# Patient Record
Sex: MALE | Race: OTHER | HISPANIC OR LATINO | Employment: UNEMPLOYED | URBAN - METROPOLITAN AREA
[De-identification: names, ages, dates, MRNs, and addresses within clinical notes are randomized per-mention and may not be internally consistent; named-entity substitution may affect disease eponyms.]

---

## 2022-12-16 ENCOUNTER — HOSPITAL ENCOUNTER (EMERGENCY)
Facility: HOSPITAL | Age: 42
Discharge: HOME | End: 2022-12-17
Attending: EMERGENCY MEDICINE

## 2022-12-16 ENCOUNTER — APPOINTMENT (EMERGENCY)
Dept: RADIOLOGY | Facility: HOSPITAL | Age: 42
End: 2022-12-16

## 2022-12-16 DIAGNOSIS — R55 SYNCOPE: ICD-10-CM

## 2022-12-16 DIAGNOSIS — R07.9 CHEST PAIN IN ADULT: ICD-10-CM

## 2022-12-16 DIAGNOSIS — U07.1 COVID-19 VIRUS INFECTION: Primary | ICD-10-CM

## 2022-12-16 LAB
ALBUMIN SERPL BCP-MCNC: 4.2 G/DL (ref 3.5–5)
ALP SERPL-CCNC: 49 U/L (ref 34–104)
ALT SERPL W P-5'-P-CCNC: 22 U/L (ref 7–52)
ANION GAP SERPL CALCULATED.3IONS-SCNC: 9 MMOL/L (ref 4–13)
AST SERPL W P-5'-P-CCNC: 15 U/L (ref 13–39)
BASOPHILS # BLD AUTO: 0.03 THOUSANDS/ÂΜL (ref 0–0.1)
BASOPHILS NFR BLD AUTO: 0 % (ref 0–1)
BILIRUB SERPL-MCNC: 0.36 MG/DL (ref 0.2–1)
BNP SERPL-MCNC: 1 PG/ML (ref 0–100)
BUN SERPL-MCNC: 25 MG/DL (ref 5–25)
CALCIUM SERPL-MCNC: 8.9 MG/DL (ref 8.4–10.2)
CARDIAC TROPONIN I PNL SERPL HS: 4 NG/L
CHLORIDE SERPL-SCNC: 104 MMOL/L (ref 96–108)
CO2 SERPL-SCNC: 25 MMOL/L (ref 21–32)
CREAT SERPL-MCNC: 1.32 MG/DL (ref 0.6–1.3)
D DIMER PPP FEU-MCNC: <0.27 UG/ML FEU
EOSINOPHIL # BLD AUTO: 0.18 THOUSAND/ÂΜL (ref 0–0.61)
EOSINOPHIL NFR BLD AUTO: 3 % (ref 0–6)
ERYTHROCYTE [DISTWIDTH] IN BLOOD BY AUTOMATED COUNT: 13 % (ref 11.6–15.1)
FLUAV RNA RESP QL NAA+PROBE: NEGATIVE
FLUBV RNA RESP QL NAA+PROBE: NEGATIVE
GFR SERPL CREATININE-BSD FRML MDRD: 67 ML/MIN/1.73SQ M
GLUCOSE SERPL-MCNC: 138 MG/DL (ref 65–140)
HCT VFR BLD AUTO: 42.4 % (ref 36.5–49.3)
HGB BLD-MCNC: 14.4 G/DL (ref 12–17)
IMM GRANULOCYTES # BLD AUTO: 0.01 THOUSAND/UL (ref 0–0.2)
IMM GRANULOCYTES NFR BLD AUTO: 0 % (ref 0–2)
LYMPHOCYTES # BLD AUTO: 2.75 THOUSANDS/ÂΜL (ref 0.6–4.47)
LYMPHOCYTES NFR BLD AUTO: 39 % (ref 14–44)
MAGNESIUM SERPL-MCNC: 2 MG/DL (ref 1.9–2.7)
MCH RBC QN AUTO: 29.2 PG (ref 26.8–34.3)
MCHC RBC AUTO-ENTMCNC: 34 G/DL (ref 31.4–37.4)
MCV RBC AUTO: 86 FL (ref 82–98)
MONOCYTES # BLD AUTO: 0.4 THOUSAND/ÂΜL (ref 0.17–1.22)
MONOCYTES NFR BLD AUTO: 6 % (ref 4–12)
NEUTROPHILS # BLD AUTO: 3.76 THOUSANDS/ÂΜL (ref 1.85–7.62)
NEUTS SEG NFR BLD AUTO: 52 % (ref 43–75)
NRBC BLD AUTO-RTO: 0 /100 WBCS
PLATELET # BLD AUTO: 221 THOUSANDS/UL (ref 149–390)
PMV BLD AUTO: 11.4 FL (ref 8.9–12.7)
POTASSIUM SERPL-SCNC: 4 MMOL/L (ref 3.5–5.3)
PROT SERPL-MCNC: 7.1 G/DL (ref 6.4–8.4)
RBC # BLD AUTO: 4.93 MILLION/UL (ref 3.88–5.62)
RSV RNA RESP QL NAA+PROBE: NEGATIVE
SARS-COV-2 RNA RESP QL NAA+PROBE: POSITIVE
SODIUM SERPL-SCNC: 138 MMOL/L (ref 135–147)
WBC # BLD AUTO: 7.13 THOUSAND/UL (ref 4.31–10.16)

## 2022-12-16 RX ORDER — ACETAMINOPHEN 325 MG/1
650 TABLET ORAL ONCE
Status: COMPLETED | OUTPATIENT
Start: 2022-12-16 | End: 2022-12-16

## 2022-12-16 RX ORDER — KETOROLAC TROMETHAMINE 30 MG/ML
15 INJECTION, SOLUTION INTRAMUSCULAR; INTRAVENOUS ONCE
Status: COMPLETED | OUTPATIENT
Start: 2022-12-16 | End: 2022-12-16

## 2022-12-16 RX ADMIN — ACETAMINOPHEN 650 MG: 325 TABLET, FILM COATED ORAL at 22:41

## 2022-12-16 RX ADMIN — SODIUM CHLORIDE 1000 ML: 0.9 INJECTION, SOLUTION INTRAVENOUS at 22:20

## 2022-12-16 RX ADMIN — KETOROLAC TROMETHAMINE 15 MG: 30 INJECTION, SOLUTION INTRAMUSCULAR; INTRAVENOUS at 23:00

## 2022-12-16 NOTE — Clinical Note
Sophia Araceli was seen and treated in our emergency department on 12/16/2022  Diagnosis:     Maritza Bailey  may return to work on return date  He may return on this date: 12/23/2022         If you have any questions or concerns, please don't hesitate to call        Mayela Hammond MD    ______________________________           _______________          _______________  Hospital Representative                              Date                                Time

## 2022-12-16 NOTE — Clinical Note
Racquel Rose was seen and treated in our emergency department on 12/16/2022  Diagnosis:     China Meade  may return to work on return date  He may return on this date: 12/23/2022         If you have any questions or concerns, please don't hesitate to call        Natalie Ernandez MD    ______________________________           _______________          _______________  Hospital Representative                              Date                                Time

## 2022-12-17 VITALS
SYSTOLIC BLOOD PRESSURE: 98 MMHG | BODY MASS INDEX: 27.28 KG/M2 | DIASTOLIC BLOOD PRESSURE: 62 MMHG | HEIGHT: 68 IN | RESPIRATION RATE: 16 BRPM | OXYGEN SATURATION: 97 % | WEIGHT: 180 LBS | TEMPERATURE: 98.7 F | HEART RATE: 66 BPM

## 2022-12-17 LAB
2HR DELTA HS TROPONIN: 7 NG/L
4HR DELTA HS TROPONIN: 9 NG/L
ATRIAL RATE: 73 BPM
ATRIAL RATE: 78 BPM
CARDIAC TROPONIN I PNL SERPL HS: 11 NG/L
CARDIAC TROPONIN I PNL SERPL HS: 13 NG/L
P AXIS: 41 DEGREES
P AXIS: 43 DEGREES
PR INTERVAL: 124 MS
PR INTERVAL: 138 MS
QRS AXIS: 41 DEGREES
QRS AXIS: 65 DEGREES
QRSD INTERVAL: 100 MS
QRSD INTERVAL: 102 MS
QT INTERVAL: 362 MS
QT INTERVAL: 370 MS
QTC INTERVAL: 407 MS
QTC INTERVAL: 412 MS
T WAVE AXIS: 62 DEGREES
T WAVE AXIS: 64 DEGREES
VENTRICULAR RATE: 73 BPM
VENTRICULAR RATE: 78 BPM

## 2022-12-17 RX ORDER — LORAZEPAM 2 MG/ML
0.5 INJECTION INTRAMUSCULAR ONCE
Status: COMPLETED | OUTPATIENT
Start: 2022-12-17 | End: 2022-12-17

## 2022-12-17 RX ADMIN — LORAZEPAM 0.5 MG: 2 INJECTION INTRAMUSCULAR; INTRAVENOUS at 00:39

## 2022-12-17 NOTE — ED PROVIDER NOTES
History  Chief Complaint   Patient presents with   • Syncope     Reports syncopal episode while showering, reports chest pressure     Patient is a 42-year-old male seen in the emergency department with concern for episode of syncope while showering this evening  Patient states that he was feeling lightheaded/dizzy in addition to some chest pressure while in the shower at home prior to evaluation  Patient states that he had a loss of consciousness  Patient denies having similar symptoms in the past   Patient notes no headache, shortness of breath, cough, hemoptysis, fever, nausea, vomiting, diarrhea, weakness  Patient states that he has been feeling somewhat tired over approximately the past 2-3 days  Patient states that he has been eating and drinking normally  Patient denies any similar episodes in the past   Patient denies any known history of cardiac problems in his family  Patient states that he takes supplements at home, including [place] and Test X180 [place]  None       History reviewed  No pertinent past medical history  History reviewed  No pertinent surgical history  History reviewed  No pertinent family history  I have reviewed and agree with the history as documented  E-Cigarette/Vaping   • E-Cigarette Use Never User      E-Cigarette/Vaping Substances     Social History     Tobacco Use   • Smoking status: Former     Types: Cigarettes   • Smokeless tobacco: Never   Vaping Use   • Vaping Use: Never used   Substance Use Topics   • Alcohol use: Yes   • Drug use: Not Currently       Review of Systems   Constitutional: Positive for fatigue  Negative for chills and fever  HENT: Negative for drooling and trouble swallowing  Eyes: Negative for pain and visual disturbance  Respiratory: Negative for cough and shortness of breath  Cardiovascular: Positive for chest pain  Negative for palpitations  Gastrointestinal: Negative for abdominal pain and vomiting     Genitourinary: Negative for decreased urine volume and difficulty urinating  Musculoskeletal: Negative for gait problem and neck stiffness  Skin: Negative for color change and rash  Neurological: Positive for dizziness and syncope  Psychiatric/Behavioral: Negative for agitation and confusion  All other systems reviewed and are negative  Physical Exam  Physical Exam  Vitals and nursing note reviewed  Constitutional:       General: He is not in acute distress  Appearance: He is well-developed  HENT:      Head: Normocephalic and atraumatic  Right Ear: External ear normal       Left Ear: External ear normal       Nose: Nose normal       Mouth/Throat:      Pharynx: Oropharynx is clear  Eyes:      General: No scleral icterus  Conjunctiva/sclera: Conjunctivae normal    Cardiovascular:      Rate and Rhythm: Normal rate and regular rhythm  Heart sounds: No murmur heard  Pulmonary:      Effort: Pulmonary effort is normal  No respiratory distress  Breath sounds: Normal breath sounds  Abdominal:      General: There is no distension  Palpations: Abdomen is soft  Tenderness: There is no abdominal tenderness  Musculoskeletal:         General: No swelling, deformity or signs of injury  Cervical back: Normal range of motion and neck supple  Skin:     General: Skin is warm and dry  Neurological:      General: No focal deficit present  Mental Status: He is alert  Cranial Nerves: No cranial nerve deficit  Sensory: No sensory deficit  Psychiatric:         Mood and Affect: Mood normal          Thought Content:  Thought content normal          Vital Signs  ED Triage Vitals   Temperature Pulse Respirations Blood Pressure SpO2   12/16/22 2212 12/16/22 2212 12/16/22 2212 12/16/22 2212 12/16/22 2212   98 7 °F (37 1 °C) 80 18 120/74 99 %      Temp Source Heart Rate Source Patient Position - Orthostatic VS BP Location FiO2 (%)   12/16/22 2212 12/16/22 2212 12/16/22 2212 12/16/22 2212 --   Oral Monitor Lying Left arm       Pain Score       12/16/22 2241       8           Vitals:    12/16/22 2212 12/16/22 2357 12/17/22 0038   BP: 120/74 116/71 106/73   Pulse: 80 76 70   Patient Position - Orthostatic VS: Lying Lying Sitting         Visual Acuity      ED Medications  Medications   sodium chloride 0 9 % bolus 1,000 mL (0 mL Intravenous Stopped 12/17/22 0038)   acetaminophen (TYLENOL) tablet 650 mg (650 mg Oral Given 12/16/22 2241)   ketorolac (TORADOL) injection 15 mg (15 mg Intravenous Given 12/16/22 2300)   LORazepam (ATIVAN) injection 0 5 mg (0 5 mg Intravenous Given 12/17/22 0039)       Diagnostic Studies  Results Reviewed     Procedure Component Value Units Date/Time    HS Troponin I 2hr [[id number]]  (Normal) Collected: 12/16/22 2358    Lab Status: Final result Specimen: Blood from Arm, Right Updated: 12/17/22 0028     hs TnI 2hr 11 ng/L      Delta 2hr hsTnI 7 ng/L     HS Troponin I 4hr [[id number]]     Lab Status: No result Specimen: Blood     COVID[facility], Influ[facility] A/B, [facility] [[id number]]  (Abnormal) Collected: 12/16/22 2220    Lab Status: Final result Specimen: Nares from Nose Updated: 12/16/22 2306     SARS-CoV-2 Positive     INFLUENZA A PCR Negative     INFLUENZA B PCR Negative     RSV PCR Negative    Narrative:      FOR PEDIATRIC PATIENTS - copy/paste COVID Guidelines URL to browser: [email or web address]  [email or web address]    SARS-CoV-2 assay is a Nucleic Acid Amplification assay intended for the  qualitative detection of nucleic acid from SARS-CoV-2 in nasopharyngeal  swabs  Results are for the presumptive identification of SARS-CoV-2 RNA  Positive results are indicative of infection with SARS-CoV-2, the virus  causing COVID-19, but do not rule out bacterial infection or co-infection  with other viruses   Laboratories within the [place] and its  territories are required to report all positive results to the appropriate  public health authorities  Negative results do not preclude SARS-CoV-2  infection and should not be used as the sole basis for treatment or other  patient management decisions  Negative results must be combined with  clinical observations, patient history, and epidemiological information  This test has not been FDA cleared or approved  This test has been authorized by FDA under an Emergency Use Authorization  (EUA)  This test is only authorized for the duration of time the  declaration that circumstances exist justifying the authorization of the  emergency use of an in vitro diagnostic tests for detection of SARS-CoV-2  virus and/or diagnosis of COVID-19 infection under section 564(b)(1) of  the Act, 21 U  S C  185LJB-7(U)(6), unless the authorization is terminated  or revoked sooner  The test has been validated but independent review by FDA  and CLIA is pending  Test performed using [employer] GeneXpert: This RT-PCR assay targets N2,  a region unique to SARS-CoV-2  A conserved region in the E-gene was chosen  for pan-Sarbecovirus detection which includes SARS-CoV-2  According to CMS-2020-01-R, this platform meets the definition of high-throughput technology      B-Type Natriuretic Peptide(BNP), AN, CA, [place] Camp[facility] Only [[id number]]  (Normal) Collected: 12/16/22 2220    Lab Status: Final result Specimen: Blood from Arm, Right Updated: 12/16/22 2256     BNP 1 pg/mL     HS Troponin 0hr (reflex protocol) [[id number]]  (Normal) Collected: 12/16/22 2220    Lab Status: Final result Specimen: Blood from Arm, Right Updated: 12/16/22 2253     hs TnI 0hr 4 ng/L     D-dimer, quantitative [[id number]]  (Normal) Collected: 12/16/22 2220    Lab Status: Final result Specimen: Blood from Arm, Right Updated: 12/16/22 2249     D-Dimer, Quant <0 27 ug/ml FEU     Comprehensive metabolic panel [[id number]]  (Abnormal) Collected: 12/16/22 2220    Lab Status: Final result Specimen: Blood from Arm, Right Updated: 12/16/22 2249     Sodium 138 mmol/L Potassium 4 0 mmol/L      Chloride 104 mmol/L      CO2 25 mmol/L      ANION GAP 9 mmol/L      BUN 25 mg/dL      Creatinine 1 32 mg/dL      Glucose 138 mg/dL      Calcium 8 9 mg/dL      AST 15 U/L      ALT 22 U/L      Alkaline Phosphatase 49 U/L      Total Protein 7 1 g/dL      Albumin 4 2 g/dL      Total Bilirubin 0 36 mg/dL      eGFR 67 ml/min/1 73sq m     Narrative:      [place] guidelines for Chronic Kidney Disease (CKD):   •  Stage 1 with normal or high GFR (GFR > 90 mL/min/1 73 square meters)  •  Stage 2 Mild CKD (GFR = 60-89 mL/min/1 73 square meters)  •  Stage 3A Moderate CKD (GFR = 45-59 mL/min/1 73 square meters)  •  Stage 3B Moderate CKD (GFR = 30-44 mL/min/1 73 square meters)  •  Stage 4 Severe CKD (GFR = 15-29 mL/min/1 73 square meters)  •  Stage 5 End Stage CKD (GFR <15 mL/min/1 73 square meters)  Note: GFR calculation is accurate only with a steady state creatinine    Magnesium [[id number]]  (Normal) Collected: 12/16/22 2220    Lab Status: Final result Specimen: Blood from Arm, Right Updated: 12/16/22 2249     Magnesium 2 0 mg/dL     CBC and differential [[id number]] Collected: 12/16/22 2220    Lab Status: Final result Specimen: Blood from Arm, Right Updated: 12/16/22 2228     WBC 7 13 Thousand/uL      RBC 4 93 Million/uL      Hemoglobin 14 4 g/dL      Hematocrit 42 4 %      MCV 86 fL      MCH 29 2 pg      MCHC 34 0 g/dL      RDW 13 0 %      MPV 11 4 fL      Platelets 455 Thousands/uL      nRBC 0 /100 WBCs      Neutrophils Relative 52 %      Immat GRANS % 0 %      Lymphocytes Relative 39 %      Monocytes Relative 6 %      Eosinophils Relative 3 %      Basophils Relative 0 %      Neutrophils Absolute 3 76 Thousands/µL      Immature Grans Absolute 0 01 Thousand/uL      Lymphocytes Absolute 2 75 Thousands/µL      Monocytes Absolute 0 40 Thousand/µL      Eosinophils Absolute 0 18 Thousand/µL      Basophils Absolute 0 03 Thousands/µL                  XR chest 1 view portable   ED Interpretation by José Gifford MD (12/16 2241)   No infiltrate or pneumothorax                 Procedures  ECG 12 Lead Documentation Only    Date/Time: 12/16/2022 10:20 PM  Performed by: José Gifford MD  Authorized by: José Gifford MD     Indications / Diagnosis:  Syncope  Patient location:  ED  Rate:     ECG rate:  78    ECG rate assessment: normal    Rhythm:     Rhythm: sinus rhythm    QRS:     QRS axis:  Normal  ST segments:     ST segments:  Non-specific  T waves:     T waves: non-specific    Comments:      Normal sinus rhythm at 78, normal axis, , , QTc 412, nonspecific ST-T wave abnormality, no definite evidence of acute ischemia    ECG 12 Lead Documentation Only    Date/Time: 12/17/2022 12:56 AM  Performed by: José Gifford MD  Authorized by: José Gifford MD     Indications / Diagnosis:  Chest pain  ECG reviewed by me, the ED Provider: yes    Patient location:  ED  Rate:     ECG rate:  74    ECG rate assessment: normal    Rhythm:     Rhythm: sinus rhythm    QRS:     QRS axis:  Normal  ST segments:     ST segments:  Non-specific  T waves:     T waves: non-specific    Comments:      Normal sinus rhythm at 73, normal axis, , , QTc 407, nonspecific ST-T wave abnormality, no definite evidence of acute ischemia             ED Course             HEART Risk Score    Flowsheet Row Most Recent Value   Heart Score Risk Calculator    History 1 Filed at: 12/17/2022 0040   ECG 1 Filed at: 12/17/2022 0040   Age 0 Filed at: 12/17/2022 0040   Risk Factors 0 Filed at: 12/17/2022 0040   Troponin 0 Filed at: 12/17/2022 0040   HEART Score 2 Filed at: 12/17/2022 0040                                      MDM  Number of Diagnoses or Management Options  Chest pain in adult  COVID-19 virus infection  Syncope  Diagnosis management comments: Patient is a 82-year-old male seen in the emergency department with concern for episode of chest pain, syncope    Differential diagnosis includes ACS, PE, pneumothorax, pneumonia, viral illness, vasovagal episode  EKG was obtained and noted  Chest x-ray showed no infiltrate or pneumothorax  Patient was treated with medication for symptom control  COVID-19 test was ordered in the emergency department, and was positive  Laboratory evaluation remarkable for D-dimer within normal limits, elevated creatinine of 1 32, serial troponins of 4, 11  Patient signed out to my colleague at change of shift, with plan for repeat troponin  Amount and/or Complexity of Data Reviewed  Clinical lab tests: ordered and reviewed  Tests in the radiology section of CPT®: ordered and reviewed  Tests in the medicine section of CPT®: ordered and reviewed        Disposition  Final diagnoses:   Syncope   Chest pain in adult   COVID-19 virus infection     Time reflects when diagnosis was documented in both MDM as applicable and the Disposition within this note     Time User Action Codes Description Comment    12/16/2022 10:45 PM Yousif Hind Add [R55] Syncope     12/16/2022 10:45 PM Yousif Hind Add [R07 9] Chest pain in adult     12/16/2022 11:08 PM Yousif Hind Add [U07 1] COVID-19 virus infection     12/16/2022 11:08 PM Yousif Hind Modify [R55] Syncope     12/16/2022 11:08 PM Yousif Hind Modify [U07 1] COVID-19 virus infection       ED Disposition     None      Follow-up Information     Follow up With Specialties Details Why Contact Info Additional Information    Your primary doctor  Call in 1 day       [place] Call  As needed [address] [phone]  [address]  , [place], [place], [address]          Patient's Medications    No medications on file       No discharge procedures on file      PDMP Review     None          ED Provider  Electronically Signed by           Natalie Ernandez MD  12/1980

## 2022-12-17 NOTE — ED CARE HANDOFF
Emergency Department Sign Out Note        HEART Risk Score    Flowsheet Row Most Recent Value   Heart Score Risk Calculator    History 1 Filed at: 12/17/2022 0040   ECG 1 Filed at: 12/17/2022 0040   Age 0 Filed at: 12/17/2022 0040   Risk Factors 0 Filed at: 12/17/2022 0040   Troponin 0 Filed at: 12/17/2022 0040   HEART Score 2 Filed at: 12/17/2022 0040                ED Course as of 12/17/22 0336   Sat Dec 17, 2022   0114 Patient was signed out to me at change of shift  He is pending a repeat troponin  The patient is COVID positive and is at low risk based on Heart Score  2780 Patient had a third repeat troponin of 13  The delta is 9  The patient's has no current chest pain and has a heart score of 2  The EKG is nonischemic   the patient is at low risk for ACS event  the patient is positive for COVID and his symptoms are likely due to his infection  The patient did have a negative D-dimer  He will be discharged from follow-up to his primary care physician  Procedures  MDM        Disposition  Final diagnoses:   Syncope   Chest pain in adult   COVID-19 virus infection     Time reflects when diagnosis was documented in both MDM as applicable and the Disposition within this note     Time User Action Codes Description Comment    12/16/2022 10:45 PM Gwendolyn Precise Add [R55] Syncope     12/16/2022 10:45 PM Gwendolyn Precise Add [R07 9] Chest pain in adult     12/16/2022 11:08 PM Gwendolyn Precise Add [U07 1] COVID-19 virus infection     12/16/2022 11:08 PM Gwendolyn Precise Modify [R55] Syncope     12/16/2022 11:08 PM Gwendolyn Precise Modify [U07 1] COVID-19 virus infection       ED Disposition     ED Disposition   Discharge    Condition   Stable    Date/Time   Sat Dec 17, 2022  3:07 AM    Comment   [address] discharge to home/self care                 Follow-up Information     Follow up With Specialties Details Why Contact Info Additional Information    Your primary doctor  Call in 1 day       [facility] [address] Call  As needed [address] [phone]  [address] , [place], [place], [address]        There are no discharge medications for this patient  No discharge procedures on file         ED Provider  Electronically Signed by     Vesna Carpenter DO  12/17/22 5760

## 2022-12-30 ENCOUNTER — HOSPITAL ENCOUNTER (EMERGENCY)
Facility: HOSPITAL | Age: 42
Discharge: HOME | End: 2022-12-30
Attending: EMERGENCY MEDICINE

## 2022-12-30 ENCOUNTER — APPOINTMENT (EMERGENCY)
Dept: RADIOLOGY | Facility: HOSPITAL | Age: 42
End: 2022-12-30

## 2022-12-30 VITALS
SYSTOLIC BLOOD PRESSURE: 114 MMHG | HEART RATE: 86 BPM | DIASTOLIC BLOOD PRESSURE: 66 MMHG | RESPIRATION RATE: 18 BRPM | OXYGEN SATURATION: 99 % | TEMPERATURE: 98.6 F

## 2022-12-30 DIAGNOSIS — R07.9 CHEST PAIN: Primary | ICD-10-CM

## 2022-12-30 LAB
2HR DELTA HS TROPONIN: <0 NG/L
ANION GAP SERPL CALCULATED.3IONS-SCNC: 8 MMOL/L (ref 4–13)
ATRIAL RATE: 84 BPM
BASOPHILS # BLD AUTO: 0.04 THOUSANDS/ÂΜL (ref 0–0.1)
BASOPHILS NFR BLD AUTO: 1 % (ref 0–1)
BUN SERPL-MCNC: 18 MG/DL (ref 5–25)
CALCIUM SERPL-MCNC: 9.1 MG/DL (ref 8.4–10.2)
CARDIAC TROPONIN I PNL SERPL HS: 2 NG/L
CARDIAC TROPONIN I PNL SERPL HS: <2 NG/L
CHLORIDE SERPL-SCNC: 102 MMOL/L (ref 96–108)
CO2 SERPL-SCNC: 26 MMOL/L (ref 21–32)
CREAT SERPL-MCNC: 1.21 MG/DL (ref 0.6–1.3)
EOSINOPHIL # BLD AUTO: 0.27 THOUSAND/ÂΜL (ref 0–0.61)
EOSINOPHIL NFR BLD AUTO: 4 % (ref 0–6)
ERYTHROCYTE [DISTWIDTH] IN BLOOD BY AUTOMATED COUNT: 13 % (ref 11.6–15.1)
GFR SERPL CREATININE-BSD FRML MDRD: 74 ML/MIN/1.73SQ M
GLUCOSE SERPL-MCNC: 95 MG/DL (ref 65–140)
HCT VFR BLD AUTO: 39.5 % (ref 36.5–49.3)
HGB BLD-MCNC: 13.4 G/DL (ref 12–17)
IMM GRANULOCYTES # BLD AUTO: 0.01 THOUSAND/UL (ref 0–0.2)
IMM GRANULOCYTES NFR BLD AUTO: 0 % (ref 0–2)
LYMPHOCYTES # BLD AUTO: 2.08 THOUSANDS/ÂΜL (ref 0.6–4.47)
LYMPHOCYTES NFR BLD AUTO: 29 % (ref 14–44)
MCH RBC QN AUTO: 28.9 PG (ref 26.8–34.3)
MCHC RBC AUTO-ENTMCNC: 33.9 G/DL (ref 31.4–37.4)
MCV RBC AUTO: 85 FL (ref 82–98)
MONOCYTES # BLD AUTO: 0.45 THOUSAND/ÂΜL (ref 0.17–1.22)
MONOCYTES NFR BLD AUTO: 6 % (ref 4–12)
NEUTROPHILS # BLD AUTO: 4.26 THOUSANDS/ÂΜL (ref 1.85–7.62)
NEUTS SEG NFR BLD AUTO: 60 % (ref 43–75)
NRBC BLD AUTO-RTO: 0 /100 WBCS
P AXIS: 39 DEGREES
PLATELET # BLD AUTO: 207 THOUSANDS/UL (ref 149–390)
PMV BLD AUTO: 11.1 FL (ref 8.9–12.7)
POTASSIUM SERPL-SCNC: 3.9 MMOL/L (ref 3.5–5.3)
PR INTERVAL: 134 MS
QRS AXIS: 35 DEGREES
QRSD INTERVAL: 104 MS
QT INTERVAL: 364 MS
QTC INTERVAL: 430 MS
RBC # BLD AUTO: 4.63 MILLION/UL (ref 3.88–5.62)
SODIUM SERPL-SCNC: 136 MMOL/L (ref 135–147)
T WAVE AXIS: 56 DEGREES
VENTRICULAR RATE: 84 BPM
WBC # BLD AUTO: 7.11 THOUSAND/UL (ref 4.31–10.16)

## 2022-12-30 RX ORDER — ACETAMINOPHEN 500 MG
1000 TABLET ORAL EVERY 6 HOURS PRN
Qty: 40 TABLET | Refills: 0 | Status: SHIPPED | OUTPATIENT
Start: 2022-12-30

## 2022-12-30 RX ORDER — NAPROXEN 250 MG/1
250 TABLET ORAL 2 TIMES DAILY PRN
Qty: 30 TABLET | Refills: 0 | Status: SHIPPED | OUTPATIENT
Start: 2022-12-30 | End: 2023-01-14

## 2022-12-30 RX ORDER — SODIUM CHLORIDE 9 MG/ML
3 INJECTION INTRAVENOUS
Status: DISCONTINUED | OUTPATIENT
Start: 2022-12-30 | End: 2022-12-30 | Stop reason: HOSPADM

## 2022-12-30 RX ORDER — KETOROLAC TROMETHAMINE 30 MG/ML
15 INJECTION, SOLUTION INTRAMUSCULAR; INTRAVENOUS ONCE
Status: COMPLETED | OUTPATIENT
Start: 2022-12-30 | End: 2022-12-30

## 2022-12-30 RX ADMIN — KETOROLAC TROMETHAMINE 15 MG: 30 INJECTION, SOLUTION INTRAMUSCULAR at 17:56

## 2022-12-30 NOTE — ED PROVIDER NOTES
History  Chief Complaint   Patient presents with   • Chest Pain     PT presents to ED from home w/ CP  No cardiac hx      31-year-old male presents to the emergency department for paroxysmal episodes of chest pressure  Present for couple weeks  Associated with tingling in his fingers when they happen  Associated with lightheadedness  Has not passed out  No episodes of syncope  Nothing makes the pain better or worse  Goes away on its own  Lasts 30 minutes to a couple hours at a time  Is currently having 1 of these episodes  Was seen previously for chest pain  Diagnosed with COVID-19 on December 16  Denies any other symptoms  No hemoptysis, recent travel or hospitalization, shortness of breath, lower extremity edema, history of VTE  No ACS risk factors  Pain not ripping or tearing  Denies lower extremity edema  Chest Pain  Pain location:  L chest  Pain quality: pressure    Pain radiates to:  Does not radiate  Pain radiates to the back: no    Pain severity:  Moderate  Onset quality:  Gradual  Timing:  Intermittent  Progression:  Waxing and waning  Chronicity:  New  Relieved by:  Nothing  Worsened by:  Nothing tried      None       History reviewed  No pertinent past medical history  History reviewed  No pertinent surgical history  History reviewed  No pertinent family history  I have reviewed and agree with the history as documented  E-Cigarette/Vaping   • E-Cigarette Use Never User      E-Cigarette/Vaping Substances     Social History     Tobacco Use   • Smoking status: Former     Types: Cigarettes   • Smokeless tobacco: Never   Vaping Use   • Vaping Use: Never used   Substance Use Topics   • Alcohol use: Yes   • Drug use: Not Currently       Review of Systems   Cardiovascular: Positive for chest pain  Neurological: Positive for light-headedness  Hand paresthesias     All other systems reviewed and are negative        Physical Exam  Physical Exam  Vitals and nursing note reviewed  Constitutional:       General: He is not in acute distress  Appearance: He is well-developed  He is not diaphoretic  HENT:      Head: Normocephalic and atraumatic  Right Ear: External ear normal       Left Ear: External ear normal    Eyes:      Conjunctiva/sclera: Conjunctivae normal    Neck:      Trachea: No tracheal deviation  Cardiovascular:      Rate and Rhythm: Normal rate and regular rhythm  Pulses:           Radial pulses are 2+ on the right side and 2+ on the left side  Heart sounds: Normal heart sounds  No murmur heard  Pulmonary:      Effort: No respiratory distress  Breath sounds: Normal breath sounds  No stridor  No wheezing or rales  Abdominal:      General: Bowel sounds are normal  There is no distension  Palpations: Abdomen is soft  There is no mass  Tenderness: There is no abdominal tenderness  There is no guarding or rebound  Comments: No pulsatile abdominal mass  Musculoskeletal:         General: No tenderness or deformity  Skin:     General: Skin is dry  Findings: No rash  Neurological:      Motor: No abnormal muscle tone  Coordination: Coordination normal    Psychiatric:         Behavior: Behavior normal          Thought Content:  Thought content normal          Judgment: Judgment normal          Vital Signs  ED Triage Vitals   Temperature Pulse Respirations Blood Pressure SpO2   12/30/22 1658 12/30/22 1658 12/30/22 1658 12/30/22 1658 12/30/22 1658   98 6 °F (37 °C) 79 16 115/73 100 %      Temp Source Heart Rate Source Patient Position - Orthostatic VS BP Location FiO2 (%)   12/30/22 1658 12/30/22 1947 12/30/22 1947 12/30/22 1947 --   Oral Monitor Lying Right arm       Pain Score       12/30/22 1756       7           Vitals:    12/30/22 1658 12/30/22 1947 12/30/22 2038   BP: 115/73 113/71 114/66   Pulse: 79 74 86   Patient Position - Orthostatic VS:  Lying Sitting         Visual Acuity      ED Medications  Medications sodium chloride (PF) 0 9 % injection 3 mL (has no administration in time range)   ketorolac (TORADOL) injection 15 mg (15 mg Intravenous Given 12/30/22 1756)       Diagnostic Studies  Results Reviewed     Procedure Component Value Units Date/Time    HS Troponin I 2hr [[id number]]  (Normal) Collected: 12/30/22 1953    Lab Status: Final result Specimen: Blood from Arm, Left Updated: 12/30/22 2024     hs TnI 2hr <2 ng/L      Delta 2hr hsTnI <0 ng/L     HS Troponin I 4hr [[id number]]     Lab Status: No result Specimen: Blood     HS Troponin 0hr (reflex protocol) [[id number]]  (Normal) Collected: 12/30/22 1758    Lab Status: Final result Specimen: Blood from Arm, Left Updated: 12/30/22 1825     hs TnI 0hr 2 ng/L     Basic metabolic panel [[id number]] Collected: 12/30/22 1758    Lab Status: Final result Specimen: Blood from Arm, Left Updated: 12/30/22 1819     Sodium 136 mmol/L      Potassium 3 9 mmol/L      Chloride 102 mmol/L      CO2 26 mmol/L      ANION GAP 8 mmol/L      BUN 18 mg/dL      Creatinine 1 21 mg/dL      Glucose 95 mg/dL      Calcium 9 1 mg/dL      eGFR 74 ml/min/1 73sq m     Narrative:      [place] guidelines for Chronic Kidney Disease (CKD):   •  Stage 1 with normal or high GFR (GFR > 90 mL/min/1 73 square meters)  •  Stage 2 Mild CKD (GFR = 60-89 mL/min/1 73 square meters)  •  Stage 3A Moderate CKD (GFR = 45-59 mL/min/1 73 square meters)  •  Stage 3B Moderate CKD (GFR = 30-44 mL/min/1 73 square meters)  •  Stage 4 Severe CKD (GFR = 15-29 mL/min/1 73 square meters)  •  Stage 5 End Stage CKD (GFR <15 mL/min/1 73 square meters)  Note: GFR calculation is accurate only with a steady state creatinine    CBC and differential [[id number]] Collected: 12/30/22 1758    Lab Status: Final result Specimen: Blood from Arm, Left Updated: 12/30/22 1803     WBC 7 11 Thousand/uL      RBC 4 63 Million/uL      Hemoglobin 13 4 g/dL      Hematocrit 39 5 %      MCV 85 fL      MCH 28 9 pg      MCHC 33 9 g/dL      RDW 13 0 %      MPV 11 1 fL      Platelets 792 Thousands/uL      nRBC 0 /100 WBCs      Neutrophils Relative 60 %      Immat GRANS % 0 %      Lymphocytes Relative 29 %      Monocytes Relative 6 %      Eosinophils Relative 4 %      Basophils Relative 1 %      Neutrophils Absolute 4 26 Thousands/µL      Immature Grans Absolute 0 01 Thousand/uL      Lymphocytes Absolute 2 08 Thousands/µL      Monocytes Absolute 0 45 Thousand/µL      Eosinophils Absolute 0 27 Thousand/µL      Basophils Absolute 0 04 Thousands/µL                  X-ray chest 1 view portable   ED Interpretation by Nikki Bright DO (12/30 1803)   No acute cardiopulmonary findings  Procedures  Procedures         ED Course  ED Course as of 12/30/22 2041   Fri Dec 30, 2022   1722 Procedure Note: EKG  Date/Time: 12/30/22 5:22 PM   Interpreted by: Miriam Roque  Indications / Diagnosis: CP  ECG reviewed by me, the ED Provider: yes   The EKG demonstrates:  Rhythm: normal sinus  Intervals: normal intervals  Axis: normal axis  QRS/Blocks: normal QRS/ incomplete RBBB  ST Changes: No acute ST Changes, no STD/JUAREZ  1mm elevation in v2 from incomplete RBB/ BEAU  Consistent with previous ecg       2024 Procedure Note: EKG  Date/Time: 12/30/22 8:24 PM   Interpreted by: Miriam Roque  Indications / Diagnosis: CP  ECG reviewed by me, the ED Provider: yes   The EKG demonstrates:  Rhythm: normal sinus  Intervals: normal intervals  Axis: normal axis  QRS/Blocks: normal QRS  ST Changes: No acute ST Changes, no STD/JUAREZ                 HEART Risk Score    Flowsheet Row Most Recent Value   Heart Score Risk Calculator    History 0 Filed at: 12/30/2022 2025   ECG 0 Filed at: 12/30/2022 2025   Age 0 Filed at: 12/30/2022 2025   Risk Factors 0 Filed at: 12/30/2022 2025   Troponin 0 Filed at: 12/30/2022 2025   HEART Score 0 Filed at: 12/30/2022 2025                                      MDM  Number of Diagnoses or Management Options  Chest pain: new and requires workup  Diagnosis management comments: Patient with chest pressure, lightheadedness, paresthesias in the hands during these episodes of chest pressure/lightheadedness  Will evaluate for ACS, arrhythmia, electrolyte abnormalities, myocarditis, pneumonia, pneumothorax, acute intrathoracic abnormality  No signs of heart failure on examination  PERC negative  Wells score 0  Not ripping or tearing pain  Not typical for dissection  Heart score of 0  Will discharge home  Follow-up with PCP  Return precautions given  History and findings performed using iPad  service  Amount and/or Complexity of Data Reviewed  Clinical lab tests: ordered and reviewed  Tests in the radiology section of CPT®: ordered and reviewed  Review and summarize past medical records: yes  Independent visualization of images, tracings, or specimens: yes    Risk of Complications, Morbidity, and/or Mortality  Presenting problems: moderate  Diagnostic procedures: moderate  Management options: moderate        Disposition  Final diagnoses:   Chest pain     Time reflects when diagnosis was documented in both MDM as applicable and the Disposition within this note     Time User Action Codes Description Comment    12/30/2022  8:25 PM Nohemi Bonilla Add [R07 9] Chest pain       ED Disposition     ED Disposition   Discharge    Condition   Stable    Date/Time   Fri Dec 30, 2022  8:25 PM    [place] discharge to home/self care                 Follow-up Information     Follow up With Specialties Details Why Contact Info Additional [facility] Schedule an appointment as soon as possible for a visit  As needed [address] [facility] [address] Cardiology [address] [facility], [address] [place], [place], [address] [facility] 21    [address] Emergency Department Emergency Medicine  If symptoms worsen [address] [place] [address] Emergency Department, [address]    [place]  Call  for a new primary care provider [phone]             Patient's Medications   Discharge Prescriptions    ACETAMINOPHEN (TYLENOL) 500 MG TABLET    Take 2 tablets (1,000 mg total) by mouth every 6 (six) hours as needed for mild pain for up to 20 doses       Start Date: 12/30/2022End Date: --       Order Dose: 1,000 mg       Quantity: 40 tablet    Refills: 0    NAPROXEN (NAPROSYN) 250 MG TABLET    Take 1 tablet (250 mg total) by mouth 2 (two) times a day as needed for mild pain for up to 15 days       Start Date: 12/30/2022End Date: 1/14/2023       Order Dose: 250 mg       Quantity: 30 tablet    Refills: 0       No discharge procedures on file      PDMP Review     None          ED Provider  Electronically Signed by           Lukasz Rodriguez DO  12/30/22 2046

## 2022-12-31 LAB
ATRIAL RATE: 66 BPM
P AXIS: 33 DEGREES
PR INTERVAL: 130 MS
QRS AXIS: 31 DEGREES
QRSD INTERVAL: 106 MS
QT INTERVAL: 384 MS
QTC INTERVAL: 402 MS
T WAVE AXIS: 56 DEGREES
VENTRICULAR RATE: 66 BPM

## 2022-12-31 NOTE — DISCHARGE INSTRUCTIONS
Chest xray, bloodwork and ECG showed no abnormalities  Follow up with your primary care provider or cardiology

## 2023-08-21 ENCOUNTER — HOSPITAL ENCOUNTER (EMERGENCY)
Facility: HOSPITAL | Age: 43
Discharge: HOME | End: 2023-08-21
Attending: EMERGENCY MEDICINE

## 2023-08-21 VITALS
OXYGEN SATURATION: 98 % | BODY MASS INDEX: 30.87 KG/M2 | HEIGHT: 68 IN | TEMPERATURE: 97.8 F | SYSTOLIC BLOOD PRESSURE: 124 MMHG | HEART RATE: 86 BPM | RESPIRATION RATE: 18 BRPM | DIASTOLIC BLOOD PRESSURE: 77 MMHG | WEIGHT: 203.71 LBS

## 2023-08-21 DIAGNOSIS — M54.41 ACUTE MIDLINE LOW BACK PAIN WITH RIGHT-SIDED SCIATICA: Primary | ICD-10-CM

## 2023-08-21 PROCEDURE — 99284 EMERGENCY DEPT VISIT MOD MDM: CPT | Performed by: EMERGENCY MEDICINE

## 2023-08-21 PROCEDURE — 99282 EMERGENCY DEPT VISIT SF MDM: CPT

## 2023-08-21 PROCEDURE — 96372 THER/PROPH/DIAG INJ SC/IM: CPT

## 2023-08-21 RX ORDER — KETOROLAC TROMETHAMINE 30 MG/ML
15 INJECTION, SOLUTION INTRAMUSCULAR; INTRAVENOUS ONCE
Status: COMPLETED | OUTPATIENT
Start: 2023-08-21 | End: 2023-08-21

## 2023-08-21 RX ORDER — ACETAMINOPHEN 325 MG/1
975 TABLET ORAL ONCE
Status: COMPLETED | OUTPATIENT
Start: 2023-08-21 | End: 2023-08-21

## 2023-08-21 RX ORDER — METHOCARBAMOL 500 MG/1
500 TABLET, FILM COATED ORAL 4 TIMES DAILY PRN
Qty: 20 TABLET | Refills: 0 | Status: SHIPPED | OUTPATIENT
Start: 2023-08-21 | End: 2023-08-26

## 2023-08-21 RX ORDER — ACETAMINOPHEN 500 MG
500 TABLET ORAL EVERY 6 HOURS PRN
Qty: 20 TABLET | Refills: 0 | Status: SHIPPED | OUTPATIENT
Start: 2023-08-21 | End: 2023-08-26

## 2023-08-21 RX ORDER — LIDOCAINE 50 MG/G
1 PATCH TOPICAL DAILY
Qty: 5 PATCH | Refills: 0 | Status: SHIPPED | OUTPATIENT
Start: 2023-08-21 | End: 2023-08-26

## 2023-08-21 RX ORDER — LIDOCAINE 50 MG/G
1 PATCH TOPICAL ONCE
Status: DISCONTINUED | OUTPATIENT
Start: 2023-08-21 | End: 2023-08-21 | Stop reason: HOSPADM

## 2023-08-21 RX ORDER — IBUPROFEN 600 MG/1
600 TABLET ORAL EVERY 6 HOURS PRN
Qty: 20 TABLET | Refills: 0 | Status: SHIPPED | OUTPATIENT
Start: 2023-08-21 | End: 2023-08-26

## 2023-08-21 RX ADMIN — LIDOCAINE 1 PATCH: 50 PATCH TOPICAL at 11:55

## 2023-08-21 RX ADMIN — KETOROLAC TROMETHAMINE 15 MG: 30 INJECTION, SOLUTION INTRAMUSCULAR; INTRAVENOUS at 11:53

## 2023-08-21 RX ADMIN — ACETAMINOPHEN 975 MG: 325 TABLET, FILM COATED ORAL at 11:53

## 2023-08-21 NOTE — DISCHARGE INSTRUCTIONS
Follow up with your primary care physician and the comprehensive spine program. Take the prescribed medications as directed. Please return to the emergency department if you develop worsening symptoms, severe pain, weakness, bowel/bladder dysfunction, or anything else concerning to you. Seguimiento con torres médico de atención primaria y Pradeep vilage integral de columna. [place]ouse los medicamentos recetados según las indicaciones. Regrese al departamento de emergencias si desarrolla síntomas que empeoran, dolor intenso, debilidad, disfunción intestinal/vejiga o cualquier otra cosa que le preocupe.

## 2023-08-21 NOTE — ED PROVIDER NOTES
History  Chief Complaint   Patient presents with   • Back Pain     Reports low back pain x3 dasy, denies specific injury, no meds PTA, denies urinary symptoms     44-year-old male with no past medical history presenting for evaluation of back pain. History obtained via [employer]  as patient is Pashto-speaking only. Patient endorses 3 days of midline lower back pain that at times radiates down the right leg. He feels as though his symptoms were precipitated by heavy lifting at work. He denies any falls or direct trauma to his back. He has never had pain like this before. He has occasional paresthesias in the right lower extremity but no overt numbness, no weakness, no bowel or bladder dysfunction. No fever or IV drug use. Patient did not take any medications prior to arrival for his pain. Prior to Admission Medications   Prescriptions Last Dose Informant Patient Reported? Taking?   acetaminophen (TYLENOL) 500 mg tablet   No No   Sig: Take 2 tablets (1,000 mg total) by mouth every 6 (six) hours as needed for mild pain for up to 20 doses   naproxen (Naprosyn) 250 mg tablet   No No   Sig: Take 1 tablet (250 mg total) by mouth 2 (two) times a day as needed for mild pain for up to 15 days      Facility-Administered Medications: None       History reviewed. No pertinent past medical history. History reviewed. No pertinent surgical history. History reviewed. No pertinent family history. I have reviewed and agree with the history as documented. E-Cigarette/Vaping   • E-Cigarette Use Never User      E-Cigarette/Vaping Substances     Social History     Tobacco Use   • Smoking status: Former     Types: Cigarettes   • Smokeless tobacco: Never   Vaping Use   • Vaping Use: Never used   Substance Use Topics   • Alcohol use: Yes   • Drug use: Not Currently       Review of Systems   Constitutional: Negative for fever. Respiratory: Negative for shortness of breath.     Cardiovascular: Negative for chest pain. Gastrointestinal: Negative for abdominal pain, nausea and vomiting. Musculoskeletal: Positive for back pain. Negative for gait problem. Skin: Negative for rash. Neurological: Negative for weakness and numbness. All other systems reviewed and are negative. Physical Exam  Physical Exam  Vitals and nursing note reviewed. Constitutional:       General: He is not in acute distress. Appearance: He is not ill-appearing. HENT:      Head: Normocephalic and atraumatic. Nose: Nose normal.      Mouth/Throat:      Mouth: Mucous membranes are moist.   Eyes:      Conjunctiva/sclera: Conjunctivae normal.   Cardiovascular:      Rate and Rhythm: Normal rate and regular rhythm. Heart sounds: No murmur heard. No friction rub. No gallop. Pulmonary:      Effort: Pulmonary effort is normal.      Breath sounds: Normal breath sounds. No wheezing, rhonchi or rales. Abdominal:      General: There is no distension. Palpations: Abdomen is soft. Tenderness: There is no abdominal tenderness. Musculoskeletal:         General: No swelling. Normal range of motion. Cervical back: Normal range of motion and neck supple. Comments: Tenderness to palpation along the midline lumbar spine. No deformity or step off. Skin:     General: Skin is warm and dry. Findings: No rash. Neurological:      General: No focal deficit present. Mental Status: He is alert and oriented to person, place, and time. Sensory: No sensory deficit. Motor: No weakness.    Psychiatric:         Behavior: Behavior normal.         Vital Signs  ED Triage Vitals [08/21/23 1124]   Temperature Pulse Respirations Blood Pressure SpO2   97.8 °F (36.6 °C) 86 18 124/77 98 %      Temp Source Heart Rate Source Patient Position - Orthostatic VS BP Location FiO2 (%)   Oral Monitor Sitting Left arm --      Pain Score       8           Vitals:    08/21/23 1124   BP: 124/77   Pulse: 86   Patient Position - Orthostatic VS: Sitting         Visual Acuity      ED Medications  Medications   acetaminophen (TYLENOL) tablet 975 mg (975 mg Oral Given 8/21/23 1153)   ketorolac (TORADOL) injection 15 mg (15 mg Intramuscular Given 8/21/23 1153)       Diagnostic Studies  Results Reviewed     None                 No orders to display              Procedures  Procedures         ED Course                               SBIRT 22yo+    Flowsheet Row Most Recent Value   Initial Alcohol Screen: US AUDIT-C     1. How often do you have a drink containing alcohol? 1 Filed at: 08/21/2023 1131   2. How many drinks containing alcohol do you have on a typical day you are drinking? 2 Filed at: 08/21/2023 1131   3a. Male UNDER 65: How often do you have five or more drinks on one occasion? 0 Filed at: 08/21/2023 1131   3b. FEMALE Any Age, or MALE 65+: How often do you have 4 or more drinks on one occassion? 0 Filed at: 08/21/2023 1131   Audit-C Score 3 Filed at: 08/21/2023 1131   ANGY: How many times in the past year have you. .. Used an illegal drug or used a prescription medication for non-medical reasons? Never Filed at: 08/21/2023 1131                    Medical Decision Making  46-year-old male presenting for evaluation of lower back pain. No red flag signs or symptoms to suggest cauda equina, epidural abscess, epidural hematoma. Suspect muscle spasm/musculoskeletal.  No history of direct trauma to suggest fracture. Patient with a normal neurologic exam.  Treated symptomatically. Advised follow-up with primary care physician and comprehensive spine program.  Return precautions discussed. Acute midline low back pain with right-sided sciatica: acute illness or injury  Risk  OTC drugs. Prescription drug management.           Disposition  Final diagnoses:   Acute midline low back pain with right-sided sciatica     Time reflects when diagnosis was documented in both MDM as applicable and the Disposition within this note     Time User Action Codes Description Comment    8/21/2023 11:48 AM Mohit Muse Add [M54.41] Acute midline low back pain with right-sided sciatica       ED Disposition     ED Disposition   Discharge    Condition   Stable    Date/Time   Mon Aug 21, 2023 11:48 AM    Comment   [address] discharge to home/self care.                Follow-up Information    None         Discharge Medication List as of 8/21/2023 11:51 AM      START taking these medications    Details   ibuprofen (MOTRIN) 600 mg tablet Take 1 tablet (600 mg total) by mouth every 6 (six) hours as needed for mild pain for up to 5 days, Starting Mon 8/21/2023, Until Sat 8/26/2023 at 2359, Normal      lidocaine (Lidoderm) 5 % Apply 1 patch topically over 12 hours daily for 5 days Remove & Discard patch within 12 hours or as directed by MD, Starting Mon 8/21/2023, Until Sat 8/26/2023, Normal      methocarbamol (ROBAXIN) 500 mg tablet Take 1 tablet (500 mg total) by mouth 4 (four) times a day as needed for muscle spasms (back pain) for up to 5 days, Starting Mon 8/21/2023, Until Sat 8/26/2023 at 2359, Normal         CONTINUE these medications which have CHANGED    Details   acetaminophen (TYLENOL) 500 mg tablet Take 1 tablet (500 mg total) by mouth every 6 (six) hours as needed for mild pain for up to 5 days, Starting Mon 8/21/2023, Until Sat 8/26/2023 at 2359, Normal         STOP taking these medications       naproxen (Naprosyn) 250 mg tablet Comments:   Reason for Stopping:                   PDMP Review     None          ED Provider  Electronically Signed by           Ramonita Llamas MD  08/21/23 8742

## 2023-08-21 NOTE — Clinical Note
Desi Pinedo was seen and treated in our emergency department on 8/21/2023. Diagnosis:     Brisa Osorio  may return to work on return date. He may return on this date: 08/23/2023         If you have any questions or concerns, please don't hesitate to call.       Thu Rodriguez MD    ______________________________           _______________          _______________  Hospital Representative                              Date                                Time

## 2023-08-22 ENCOUNTER — TELEPHONE (OUTPATIENT)
Dept: PHYSICAL THERAPY | Facility: OTHER | Age: 43
End: 2023-08-22

## 2023-08-22 NOTE — TELEPHONE ENCOUNTER
Call placed to the patient per comprehensive Spine Program referral.    V/M left (IN [facility]) for patient to call back. Phone number and hours of business provided. This is the 1st attempt to reach the patient. Will defer referral per protocol. H. I ??

## 2023-08-30 NOTE — TELEPHONE ENCOUNTER
Call placed to the patient per Comprehensive Spine Program referral.    Voice message left for patient to call back. Phone number and hours of business provided. This the 2nd attempt to reach the patient. Will defer per protocol. I.S #[id number] [employer]? ?

## 2023-10-07 ENCOUNTER — APPOINTMENT (EMERGENCY)
Dept: CT IMAGING | Facility: HOSPITAL | Age: 43
End: 2023-10-07

## 2023-10-07 ENCOUNTER — HOSPITAL ENCOUNTER (EMERGENCY)
Facility: HOSPITAL | Age: 43
Discharge: HOME | End: 2023-10-07
Attending: INTERNAL MEDICINE

## 2023-10-07 ENCOUNTER — APPOINTMENT (EMERGENCY)
Dept: RADIOLOGY | Facility: HOSPITAL | Age: 43
End: 2023-10-07

## 2023-10-07 VITALS
DIASTOLIC BLOOD PRESSURE: 60 MMHG | SYSTOLIC BLOOD PRESSURE: 114 MMHG | RESPIRATION RATE: 20 BRPM | TEMPERATURE: 97.7 F | OXYGEN SATURATION: 96 % | BODY MASS INDEX: 31.91 KG/M2 | HEART RATE: 82 BPM | WEIGHT: 210.54 LBS

## 2023-10-07 DIAGNOSIS — R42 DIZZINESS: Primary | ICD-10-CM

## 2023-10-07 LAB
ALBUMIN SERPL BCP-MCNC: 4.3 G/DL (ref 3.5–5)
ALP SERPL-CCNC: 49 U/L (ref 34–104)
ALT SERPL W P-5'-P-CCNC: 26 U/L (ref 7–52)
ANION GAP SERPL CALCULATED.3IONS-SCNC: 9 MMOL/L
AST SERPL W P-5'-P-CCNC: 15 U/L (ref 13–39)
ATRIAL RATE: 89 BPM
BASOPHILS # BLD AUTO: 0.04 THOUSANDS/ÂΜL (ref 0–0.1)
BASOPHILS NFR BLD AUTO: 1 % (ref 0–1)
BILIRUB SERPL-MCNC: 0.42 MG/DL (ref 0.2–1)
BUN SERPL-MCNC: 17 MG/DL (ref 5–25)
CALCIUM SERPL-MCNC: 9.2 MG/DL (ref 8.4–10.2)
CHLORIDE SERPL-SCNC: 101 MMOL/L (ref 96–108)
CO2 SERPL-SCNC: 27 MMOL/L (ref 21–32)
CREAT SERPL-MCNC: 1.17 MG/DL (ref 0.6–1.3)
EOSINOPHIL # BLD AUTO: 0.15 THOUSAND/ÂΜL (ref 0–0.61)
EOSINOPHIL NFR BLD AUTO: 2 % (ref 0–6)
ERYTHROCYTE [DISTWIDTH] IN BLOOD BY AUTOMATED COUNT: 13 % (ref 11.6–15.1)
GFR SERPL CREATININE-BSD FRML MDRD: 77 ML/MIN/1.73SQ M
GLUCOSE SERPL-MCNC: 96 MG/DL (ref 65–140)
HCT VFR BLD AUTO: 40 % (ref 36.5–49.3)
HGB BLD-MCNC: 13.8 G/DL (ref 12–17)
IMM GRANULOCYTES # BLD AUTO: 0.04 THOUSAND/UL (ref 0–0.2)
IMM GRANULOCYTES NFR BLD AUTO: 1 % (ref 0–2)
LYMPHOCYTES # BLD AUTO: 2.19 THOUSANDS/ÂΜL (ref 0.6–4.47)
LYMPHOCYTES NFR BLD AUTO: 29 % (ref 14–44)
MCH RBC QN AUTO: 29.2 PG (ref 26.8–34.3)
MCHC RBC AUTO-ENTMCNC: 34.5 G/DL (ref 31.4–37.4)
MCV RBC AUTO: 85 FL (ref 82–98)
MONOCYTES # BLD AUTO: 0.51 THOUSAND/ÂΜL (ref 0.17–1.22)
MONOCYTES NFR BLD AUTO: 7 % (ref 4–12)
NEUTROPHILS # BLD AUTO: 4.66 THOUSANDS/ÂΜL (ref 1.85–7.62)
NEUTS SEG NFR BLD AUTO: 60 % (ref 43–75)
NRBC BLD AUTO-RTO: 0 /100 WBCS
P AXIS: 54 DEGREES
PLATELET # BLD AUTO: 223 THOUSANDS/UL (ref 149–390)
PMV BLD AUTO: 11.7 FL (ref 8.9–12.7)
POTASSIUM SERPL-SCNC: 3.9 MMOL/L (ref 3.5–5.3)
PR INTERVAL: 128 MS
PROT SERPL-MCNC: 7.4 G/DL (ref 6.4–8.4)
QRS AXIS: 34 DEGREES
QRSD INTERVAL: 102 MS
QT INTERVAL: 340 MS
QTC INTERVAL: 413 MS
RBC # BLD AUTO: 4.72 MILLION/UL (ref 3.88–5.62)
SODIUM SERPL-SCNC: 137 MMOL/L (ref 135–147)
T WAVE AXIS: 56 DEGREES
TSH SERPL DL<=0.05 MIU/L-ACNC: 2.66 UIU/ML (ref 0.45–4.5)
VENTRICULAR RATE: 89 BPM
WBC # BLD AUTO: 7.59 THOUSAND/UL (ref 4.31–10.16)

## 2023-10-07 PROCEDURE — G1004 CDSM NDSC: HCPCS

## 2023-10-07 PROCEDURE — 80053 COMPREHEN METABOLIC PANEL: CPT | Performed by: PHYSICIAN ASSISTANT

## 2023-10-07 PROCEDURE — 71046 X-RAY EXAM CHEST 2 VIEWS: CPT

## 2023-10-07 PROCEDURE — 99285 EMERGENCY DEPT VISIT HI MDM: CPT | Performed by: PHYSICIAN ASSISTANT

## 2023-10-07 PROCEDURE — 93005 ELECTROCARDIOGRAM TRACING: CPT

## 2023-10-07 PROCEDURE — 84443 ASSAY THYROID STIM HORMONE: CPT | Performed by: PHYSICIAN ASSISTANT

## 2023-10-07 PROCEDURE — 36415 COLL VENOUS BLD VENIPUNCTURE: CPT | Performed by: PHYSICIAN ASSISTANT

## 2023-10-07 PROCEDURE — 85025 COMPLETE CBC W/AUTO DIFF WBC: CPT | Performed by: PHYSICIAN ASSISTANT

## 2023-10-07 PROCEDURE — 70450 CT HEAD/BRAIN W/O DYE: CPT

## 2023-10-07 PROCEDURE — 99284 EMERGENCY DEPT VISIT MOD MDM: CPT

## 2023-10-07 PROCEDURE — 96360 HYDRATION IV INFUSION INIT: CPT

## 2023-10-07 RX ORDER — MECLIZINE HYDROCHLORIDE 25 MG/1
25 TABLET ORAL ONCE
Status: COMPLETED | OUTPATIENT
Start: 2023-10-07 | End: 2023-10-07

## 2023-10-07 RX ORDER — MECLIZINE HYDROCHLORIDE 25 MG/1
25 TABLET ORAL EVERY 8 HOURS PRN
Qty: 20 TABLET | Refills: 0 | Status: SHIPPED | OUTPATIENT
Start: 2023-10-07

## 2023-10-07 RX ADMIN — SODIUM CHLORIDE 1000 ML: 0.9 INJECTION, SOLUTION INTRAVENOUS at 14:34

## 2023-10-07 RX ADMIN — MECLIZINE HYDROCHLORIDE 25 MG: 25 TABLET ORAL at 14:31

## 2023-10-07 NOTE — DISCHARGE INSTRUCTIONS
Get rest, drink plenty of fluids  Use Antivert (Meclizine) as needed for symptoms.   Follow-up with your PCP or Neurologist specialist

## 2023-10-07 NOTE — ED PROVIDER NOTES
History  Chief Complaint   Patient presents with   • Headache     C/o headache and dizziness x 1 year, this week, symptoms are getting worse      No PMH  No PSH    Patient presents to emergency department with ongoing dizziness, spinning for almost 1 year, states over the past week, symptoms have worsened, got dizzy and fell, but denies related trauma from this. Wife here with patient, states patient complains of headache but patient states does get intermittent headaches relieved with ibuprofen,  but feels more occipital pressure. Patient denies fever, URI symptoms, CP, ear pain, SOB, abdominal pain, NVD, focal weakness          None       History reviewed. No pertinent past medical history. History reviewed. No pertinent surgical history. History reviewed. No pertinent family history. I have reviewed and agree with the history as documented. E-Cigarette/Vaping   • E-Cigarette Use Never User      E-Cigarette/Vaping Substances     Social History     Tobacco Use   • Smoking status: Former     Types: Cigarettes   • Smokeless tobacco: Never   Vaping Use   • Vaping Use: Never used   Substance Use Topics   • Alcohol use: Yes   • Drug use: Not Currently       Review of Systems   Constitutional: Negative for chills and fever. HENT: Negative for congestion, hearing loss and sore throat. Eyes: Negative for photophobia and visual disturbance. Respiratory: Negative for cough and shortness of breath. Cardiovascular: Negative for chest pain and leg swelling. Gastrointestinal: Negative for abdominal pain, diarrhea, nausea and vomiting. Genitourinary: Negative for dysuria and frequency. Musculoskeletal: Negative for arthralgias and myalgias. Skin: Negative for rash. Neurological: Positive for dizziness, light-headedness and headaches. Negative for tremors, seizures, syncope, speech difficulty, weakness and numbness. Psychiatric/Behavioral: Negative for behavioral problems.    All other systems reviewed and are negative. Physical Exam  Physical Exam  Vitals and nursing note reviewed. Constitutional:       General: He is not in acute distress. Appearance: Normal appearance. He is well-developed. HENT:      Head: Normocephalic and atraumatic. Right Ear: External ear normal.      Left Ear: External ear normal.      Nose: Nose normal.      Mouth/Throat:      Mouth: Mucous membranes are moist.      Pharynx: Oropharynx is clear. Eyes:      Conjunctiva/sclera: Conjunctivae normal.      Pupils: Pupils are equal, round, and reactive to light. Comments: No nystagmus   Cardiovascular:      Rate and Rhythm: Normal rate and regular rhythm. Heart sounds: No murmur heard. Pulmonary:      Effort: Pulmonary effort is normal. No respiratory distress. Breath sounds: Normal breath sounds. Abdominal:      General: Bowel sounds are normal.      Palpations: Abdomen is soft. Musculoskeletal:         General: No swelling or tenderness. Normal range of motion. Cervical back: Normal range of motion. No tenderness. Right lower leg: No edema. Left lower leg: No edema. Lymphadenopathy:      Cervical: No cervical adenopathy. Skin:     General: Skin is warm and dry. Capillary Refill: Capillary refill takes less than 2 seconds. Neurological:      General: No focal deficit present. Mental Status: He is alert and oriented to person, place, and time. Mental status is at baseline. Cranial Nerves: No cranial nerve deficit. Motor: No weakness.       Coordination: Coordination normal.      Gait: Gait normal.      Comments: Ambulates in emergency department   Psychiatric:         Behavior: Behavior normal.         Vital Signs  ED Triage Vitals   Temperature Pulse Respirations Blood Pressure SpO2   10/07/23 1409 10/07/23 1409 10/07/23 1409 10/07/23 1409 10/07/23 1409   97.7 °F (36.5 °C) 94 18 125/77 97 %      Temp Source Heart Rate Source Patient Position - Orthostatic VS BP Location FiO2 (%)   10/07/23 1409 10/07/23 1500 10/07/23 1500 10/07/23 1500 --   Oral Monitor Lying Left arm       Pain Score       10/07/23 1409       8           Vitals:    10/07/23 1409 10/07/23 1500 10/07/23 1615   BP: 125/77 108/51 114/60   Pulse: 94 82 82   Patient Position - Orthostatic VS:  Lying Lying         Visual Acuity  Visual Acuity    Flowsheet Row Most Recent Value   L Pupil Size (mm) 3   R Pupil Size (mm) 3          ED Medications  Medications   sodium chloride 0.9 % bolus 1,000 mL (0 mL Intravenous Stopped 10/7/23 1600)   meclizine (ANTIVERT) tablet 25 mg (25 mg Oral Given 10/7/23 1431)       Diagnostic Studies  Results Reviewed     Procedure Component Value Units Date/Time    TSH, 3rd generation with Free T4 reflex [[id number]]  (Normal) Collected: 10/07/23 1428    Lab Status: Final result Specimen: Blood from Arm, Left Updated: 10/07/23 1507     TSH 3RD GENERATON 2.658 uIU/mL     Comprehensive metabolic panel [[id number]] Collected: 10/07/23 1428    Lab Status: Final result Specimen: Blood from Arm, Left Updated: 10/07/23 1450     Sodium 137 mmol/L      Potassium 3.9 mmol/L      Chloride 101 mmol/L      CO2 27 mmol/L      ANION GAP 9 mmol/L      BUN 17 mg/dL      Creatinine 1.17 mg/dL      Glucose 96 mg/dL      Calcium 9.2 mg/dL      AST 15 U/L      ALT 26 U/L      Alkaline Phosphatase 49 U/L      Total Protein 7.4 g/dL      Albumin 4.3 g/dL      Total Bilirubin 0.42 mg/dL      eGFR 77 ml/min/1.73sq m     Narrative:      [place] guidelines for Chronic Kidney Disease (CKD):   •  Stage 1 with normal or high GFR (GFR > 90 mL/min/1.73 square meters)  •  Stage 2 Mild CKD (GFR = 60-89 mL/min/1.73 square meters)  •  Stage 3A Moderate CKD (GFR = 45-59 mL/min/1.73 square meters)  •  Stage 3B Moderate CKD (GFR = 30-44 mL/min/1.73 square meters)  •  Stage 4 Severe CKD (GFR = 15-29 mL/min/1.73 square meters)  •  Stage 5 End Stage CKD (GFR <15 mL/min/1.73 square meters)  Note: GFR calculation is accurate only with a steady state creatinine    CBC and differential [[id number]] Collected: 10/07/23 1428    Lab Status: Final result Specimen: Blood from Arm, Left Updated: 10/07/23 1434     WBC 7.59 Thousand/uL      RBC 4.72 Million/uL      Hemoglobin 13.8 g/dL      Hematocrit 40.0 %      MCV 85 fL      MCH 29.2 pg      MCHC 34.5 g/dL      RDW 13.0 %      MPV 11.7 fL      Platelets 856 Thousands/uL      nRBC 0 /100 WBCs      Neutrophils Relative 60 %      Immat GRANS % 1 %      Lymphocytes Relative 29 %      Monocytes Relative 7 %      Eosinophils Relative 2 %      Basophils Relative 1 %      Neutrophils Absolute 4.66 Thousands/µL      Immature Grans Absolute 0.04 Thousand/uL      Lymphocytes Absolute 2.19 Thousands/µL      Monocytes Absolute 0.51 Thousand/µL      Eosinophils Absolute 0.15 Thousand/µL      Basophils Absolute 0.04 Thousands/µL                  XR chest 2 views   ED Interpretation by Kaylee Tompkins PA-C (10/07 4026)   nad      CT head without contrast   Final Result by Marylen Lathe, MD (10/07 1518)      No acute intracranial abnormality. Workstation performed: [id number]                    Procedures  ECG 12 Lead Documentation Only    Date/Time: 10/7/2023 2:39 PM    Performed by: Kaylee Tompkins PA-C  Authorized by: Kaylee Tompkins PA-C    Indications / Diagnosis:  Dizziness  ECG reviewed by me, the ED Provider: yes    Patient location:  ED  Previous ECG:     Comparison to cardiac monitor: Yes    Interpretation:     Interpretation: normal    Rate:     ECG rate:  89    ECG rate assessment: normal    Rhythm:     Rhythm: sinus rhythm    Comments:      No acute ischemic changes             ED Course  ED Course as of 10/07/23 1638   Sat Oct 07, 2023   1532 TSH 3RD GENERATON: 2.658  normal   1539 CBC, CMP unremarkable                               SBIRT 20yo+    Flowsheet Row Most Recent Value   Initial Alcohol Screen: US AUDIT-C     1.  How often do you have a drink containing alcohol? 0 Filed at: 10/07/2023 1412   2. How many drinks containing alcohol do you have on a typical day you are drinking? 0 Filed at: 10/07/2023 1412   3a. Male UNDER 65: How often do you have five or more drinks on one occasion? 0 Filed at: 10/07/2023 1412   3b. FEMALE Any Age, or MALE 65+: How often do you have 4 or more drinks on one occassion? 0 Filed at: 10/07/2023 1412   Audit-C Score 0 Filed at: 10/07/2023 1412                    Medical Decision Making  Patient with ongoing dizziness, spinning for almost 1 year, states over the past week, symptoms have worsened, got dizzy and fell, but denies related trauma from this. DDX: Vertigo, dehydration, orthostatic hypotension, arrhythmia, electrolyte abnormality, anemia, viral illness, TIA, migraine variant,, among others  Labs are reassuring CT shows no acute pathology, patient feeling better after vertigo. Vital signs stable, exam is nonfocal.  Patient stable for discharge to follow-up with neurologist    Amount and/or Complexity of Data Reviewed  Labs: ordered. Decision-making details documented in ED Course. Radiology: ordered and independent interpretation performed. Disposition  Final diagnoses:   Dizziness - Vertigo     Time reflects when diagnosis was documented in both MDM as applicable and the Disposition within this note     Time User Action Codes Description Comment    10/7/2023  4:17 PM Devon Fearing Add [R42] Dizziness     10/7/2023  4:18 PM Devon Fearing Modify [R42] Dizziness Vertigo      ED Disposition     ED Disposition   Discharge    Condition   Stable    Date/Time   Sat Oct 7, 2023  4:17 PM    [address] discharge to home/self care.                Follow-up Information     Follow up With Specialties Details Why Contact Info Additional [phone] [facility], DO Neurology   [address] [facility] Neu[facility] [address] [place] [facility]   [address] [facility] [address] [facility] [id number] [address] Neurology [address] [place], [address] [facility], [address], [facility]          Discharge Medication List as of 10/7/2023  4:22 PM      START taking these medications    Details   meclizine (ANTIVERT) 25 mg tablet Take 1 tablet (25 mg total) by mouth every 8 (eight) hours as needed for dizziness Can substitute generic, Starting Sat 10/7/2023, Normal             No discharge procedures on file.     PDMP Review     None          ED Provider  Electronically Signed by           Jesse Dominguez PA-C  10/07/23 5360

## 2023-10-07 NOTE — Clinical Note
Judi Kim was seen and treated in our emergency department on 10/7/2023. Diagnosis:     Ozzie Hua  may return to work on return date. He may return on this date: 10/10/2023         If you have any questions or concerns, please don't hesitate to call.       Damari Driscoll PA-C    ______________________________           _______________          _______________  Hospital Representative                              Date                                Time

## 2023-10-09 LAB
ATRIAL RATE: 89 BPM
P AXIS: 54 DEGREES
PR INTERVAL: 128 MS
QRS AXIS: 34 DEGREES
QRSD INTERVAL: 102 MS
QT INTERVAL: 340 MS
QTC INTERVAL: 413 MS
T WAVE AXIS: 56 DEGREES
VENTRICULAR RATE: 89 BPM

## 2023-10-09 PROCEDURE — 93010 ELECTROCARDIOGRAM REPORT: CPT | Performed by: INTERNAL MEDICINE

## 2024-02-08 ENCOUNTER — HOSPITAL ENCOUNTER (EMERGENCY)
Facility: HOSPITAL | Age: 44
Discharge: HOME | End: 2024-02-08
Attending: INTERNAL MEDICINE

## 2024-02-08 ENCOUNTER — APPOINTMENT (EMERGENCY)
Dept: RADIOLOGY | Facility: HOSPITAL | Age: 44
End: 2024-02-08

## 2024-02-08 VITALS
DIASTOLIC BLOOD PRESSURE: 59 MMHG | TEMPERATURE: 97.8 F | OXYGEN SATURATION: 97 % | HEART RATE: 104 BPM | SYSTOLIC BLOOD PRESSURE: 127 MMHG | WEIGHT: 210.54 LBS | BODY MASS INDEX: 31.91 KG/M2 | RESPIRATION RATE: 16 BRPM

## 2024-02-08 DIAGNOSIS — M54.50 ACUTE LOW BACK PAIN: Primary | ICD-10-CM

## 2024-02-08 PROCEDURE — 99283 EMERGENCY DEPT VISIT LOW MDM: CPT

## 2024-02-08 PROCEDURE — 99284 EMERGENCY DEPT VISIT MOD MDM: CPT | Performed by: PHYSICIAN ASSISTANT

## 2024-02-08 PROCEDURE — 72100 X-RAY EXAM L-S SPINE 2/3 VWS: CPT

## 2024-02-08 RX ORDER — DEXAMETHASONE 2 MG/1
6 TABLET ORAL
Qty: 9 TABLET | Refills: 0 | Status: SHIPPED | OUTPATIENT
Start: 2024-02-08

## 2024-02-08 RX ORDER — METHOCARBAMOL 500 MG/1
500 TABLET, FILM COATED ORAL 2 TIMES DAILY
Qty: 20 TABLET | Refills: 0 | Status: SHIPPED | OUTPATIENT
Start: 2024-02-08

## 2024-02-08 RX ORDER — IBUPROFEN 400 MG/1
800 TABLET ORAL ONCE
Status: COMPLETED | OUTPATIENT
Start: 2024-02-08 | End: 2024-02-08

## 2024-02-08 RX ORDER — ACETAMINOPHEN 500 MG
500 TABLET ORAL EVERY 6 HOURS PRN
Qty: 30 TABLET | Refills: 0 | Status: SHIPPED | OUTPATIENT
Start: 2024-02-08 | End: 2024-02-08

## 2024-02-08 RX ORDER — ACETAMINOPHEN 325 MG/1
975 TABLET ORAL ONCE
Status: COMPLETED | OUTPATIENT
Start: 2024-02-08 | End: 2024-02-08

## 2024-02-08 RX ORDER — IBUPROFEN 400 MG/1
400 TABLET ORAL EVERY 6 HOURS PRN
Qty: 30 TABLET | Refills: 0 | Status: SHIPPED | OUTPATIENT
Start: 2024-02-08

## 2024-02-08 RX ORDER — ACETAMINOPHEN 500 MG
500 TABLET ORAL EVERY 6 HOURS PRN
Qty: 30 TABLET | Refills: 0 | Status: SHIPPED | OUTPATIENT
Start: 2024-02-08

## 2024-02-08 RX ORDER — IBUPROFEN 400 MG/1
400 TABLET ORAL EVERY 6 HOURS PRN
Qty: 30 TABLET | Refills: 0 | Status: SHIPPED | OUTPATIENT
Start: 2024-02-08 | End: 2024-02-08

## 2024-02-08 RX ORDER — LIDOCAINE 50 MG/G
1 PATCH TOPICAL ONCE
Status: DISCONTINUED | OUTPATIENT
Start: 2024-02-08 | End: 2024-02-08 | Stop reason: HOSPADM

## 2024-02-08 RX ORDER — METHOCARBAMOL 500 MG/1
500 TABLET, FILM COATED ORAL ONCE
Status: COMPLETED | OUTPATIENT
Start: 2024-02-08 | End: 2024-02-08

## 2024-02-08 RX ADMIN — METHOCARBAMOL TABLETS 500 MG: 500 TABLET, COATED ORAL at 10:02

## 2024-02-08 RX ADMIN — LIDOCAINE 1 PATCH: 50 PATCH CUTANEOUS at 10:02

## 2024-02-08 RX ADMIN — ACETAMINOPHEN 975 MG: 325 TABLET, FILM COATED ORAL at 10:02

## 2024-02-08 RX ADMIN — DEXAMETHASONE SODIUM PHOSPHATE 8 MG: 10 INJECTION, SOLUTION INTRAMUSCULAR; INTRAVENOUS at 10:02

## 2024-02-08 RX ADMIN — IBUPROFEN 800 MG: 400 TABLET, FILM COATED ORAL at 10:02

## 2024-02-08 NOTE — Clinical Note
Ryder Atkinson was seen and treated in our emergency department on 2/8/2024.    No restrictions            Diagnosis:     Ryder  .    He may return on this date: 02/10/2024         If you have any questions or concerns, please don't hesitate to call.      Allen Ortiz PA-C    ______________________________           _______________          _______________  Hospital Representative                              Date                                Time

## 2024-02-08 NOTE — ED PROVIDER NOTES
History  Chief Complaint   Patient presents with    Back Pain     Pt reports lower right back pain with radiation down right leg, started yesterday.       History provided by:  Patient   used: [id number], [id number].    Back Pain  Location:  Lumbar spine (Lumbar sacral junction)  Quality:  Aching  Radiates to:  R thigh (Right posterior hip)  Pain severity:  Moderate  Onset quality:  Sudden  Duration:  1 day  Timing:  Constant  Progression:  Unchanged  Chronicity:  Recurrent (Patient reports prior history of low back pain.)  Context: physical stress and twisting    Context: not falling, not MCA, not MVA, not recent illness and not recent injury    Relieved by: Intermittent usage of analgesic medication.  Worsened by:  Nothing  Associated symptoms: no abdominal pain, no abdominal swelling, no bladder incontinence, no bowel incontinence, no chest pain, no dysuria, no fever, no leg pain, no numbness, no paresthesias, no pelvic pain, no perianal numbness and no weight loss        Prior to Admission Medications   Prescriptions Last Dose Informant Patient Reported? Taking?   meclizine (ANTIVERT) 25 mg tablet   No No   Sig: Take 1 tablet (25 mg total) by mouth every 8 (eight) hours as needed for dizziness Can substitute generic      Facility-Administered Medications: None       History reviewed. No pertinent past medical history.    History reviewed. No pertinent surgical history.    History reviewed. No pertinent family history.  I have reviewed and agree with the history as documented.    E-Cigarette/Vaping    E-Cigarette Use Never User      E-Cigarette/Vaping Substances     Social History     Tobacco Use    Smoking status: Former     Types: Cigarettes    Smokeless tobacco: Never   Vaping Use    Vaping status: Never Used   Substance Use Topics    Alcohol use: Yes    Drug use: Not Currently       Review of Systems   Constitutional:  Negative for fever and weight loss.   Cardiovascular:  Negative for chest pain.    Gastrointestinal:  Negative for abdominal pain and bowel incontinence.   Genitourinary:  Negative for bladder incontinence, dysuria and pelvic pain.   Musculoskeletal:  Positive for back pain.   Skin:  Negative for rash and wound.   Neurological:  Negative for numbness and paresthesias.   All other systems reviewed and are negative.      Physical Exam  Physical Exam  Constitutional:       Appearance: Normal appearance.   HENT:      Head: Normocephalic and atraumatic.      Right Ear: Tympanic membrane normal.      Left Ear: Tympanic membrane and external ear normal.      Nose: Nose normal.      Mouth/Throat:      Pharynx: Oropharynx is clear.   Eyes:      Conjunctiva/sclera: Conjunctivae normal.   Cardiovascular:      Rate and Rhythm: Normal rate.   Pulmonary:      Effort: Pulmonary effort is normal.   Abdominal:      General: Bowel sounds are normal. There is no distension.      Tenderness: There is no abdominal tenderness.   Musculoskeletal:         General: Tenderness (Perimuscular tenderness bilateral at the lumbar sacral junction.  No evidence of rash.) present. No swelling. Normal range of motion.      Cervical back: Normal range of motion and neck supple.        Back:    Skin:     General: Skin is warm and dry.      Capillary Refill: Capillary refill takes less than 2 seconds.      Findings: No rash.   Neurological:      General: No focal deficit present.      Mental Status: He is alert and oriented to person, place, and time.      Coordination: Coordination normal.      Gait: Gait normal.      Deep Tendon Reflexes: Reflexes normal.   Psychiatric:         Mood and Affect: Mood normal.         Behavior: Behavior normal.         Vital Signs  ED Triage Vitals   Temperature Pulse Respirations Blood Pressure SpO2   02/08/24 0910 02/08/24 0910 02/08/24 0910 02/08/24 0910 02/08/24 0910   97.8 °F (36.6 °C) 104 16 127/59 97 %      Temp Source Heart Rate Source Patient Position - Orthostatic VS BP Location FiO2 (%)    02/08/24 0910 02/08/24 0910 -- -- --   Oral Monitor         Pain Score       02/08/24 0909       10 - Worst Possible Pain           Vitals:    02/08/24 0910   BP: 127/59   Pulse: 104         Visual Acuity      ED Medications  Medications   ibuprofen (MOTRIN) tablet 800 mg (800 mg Oral Given 2/8/24 1002)   acetaminophen (TYLENOL) tablet 975 mg (975 mg Oral Given 2/8/24 1002)   methocarbamol (ROBAXIN) tablet 500 mg (500 mg Oral Given 2/8/24 1002)   dexamethasone oral liquid 8 mg 0.8 mL (8 mg Oral Given 2/8/24 1002)       Diagnostic Studies  Results Reviewed       None                   XR spine lumbar 2 or 3 views injury   Final Result by Sebastien Wesley MD (02/08 1251)      Minimal retrolisthesis of L5 on S1.         Workstation performed: [id number]                    Procedures  Procedures         ED Course                               SBIRT 20yo+      Flowsheet Row Most Recent Value   Initial Alcohol Screen: US AUDIT-C     1. How often do you have a drink containing alcohol? 0 Filed at: 02/08/2024 0910   2. How many drinks containing alcohol do you have on a typical day you are drinking?  0 Filed at: 02/08/2024 0910   3a. Male UNDER 65: How often do you have five or more drinks on one occasion? 0 Filed at: 02/08/2024 0910   Audit-C Score 0 Filed at: 02/08/2024 0910   ANGY: How many times in the past year have you...    Used an illegal drug or used a prescription medication for non-medical reasons? Never Filed at: 02/08/2024 0910                      Medical Decision Making  The language line with utilized during the history physical and educational phases.  43-year-old male presents emergency department for acute onset of low back pain.  Patient does admit to a prior history of low back pain without evaluation as outpatient.  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  Patient denies specific injury to his back yesterday but states worsening lumbar  sacral junction discomfort.  Patient admits to some radiation to the right posterior lateral thigh.  This does not past the knee.  X-rays obtained no acute process was appreciated.  Neurological exam is nonfocal.  Patient denies bowel or bladder dysfunction.  Patient denies weakness of the lower extremities.  No evidence of foot drop on exam.  Patient was educated on exam as well as x-ray findings.  Patient encouraged to obtain analgesic medications as well as prescribed medications in the take as prescribed.  Placed order for comprehensive spine service as this is a recurrent issue for patient.  Educated patient on diagnosis and home management.  Educated patient on persistent or worsening signs symptoms and to either follow-up with primary care comprehensive spine and or return to the emergency department.  Patient in understanding agreement was discharged in ambulatory condition.    Amount and/or Complexity of Data Reviewed  Radiology: ordered.    Risk  OTC drugs.  Prescription drug management.             Disposition  Final diagnoses:   Acute low back pain     Time reflects when diagnosis was documented in both MDM as applicable and the Disposition within this note       Time User Action Codes Description Comment    2/8/2024  9:48 AM Allen Ortiz Add [M54.50] Acute low back pain           ED Disposition       ED Disposition   Discharge    Condition   Stable    Date/Time   u Feb 8, 2024 1001    Comment   Ryder Atkinson discharge to home/self care.                   Follow-up Information       Follow up With Specialties Details Why Contact Info Additional Information    [facility]   [phone]            Discharge Medication List as of 2/8/2024 10:19 AM        START taking these medications    Details   dexamethasone (DECADRON) 2 mg tablet Take 3 tablets (6 mg total) by mouth daily with breakfast, Starting Thu 2/8/2024, Normal       methocarbamol (ROBAXIN) 500 mg tablet Take 1 tablet (500 mg total) by mouth 2 (two) times a day, Starting Thu 2/8/2024, Normal      acetaminophen (TYLENOL) 500 mg tablet Take 1 tablet (500 mg total) by mouth every 6 (six) hours as needed for mild pain, moderate pain, headaches or fever, Starting Thu 2/8/2024, Print      ibuprofen (MOTRIN) 400 mg tablet Take 1 tablet (400 mg total) by mouth every 6 (six) hours as needed for mild pain, Starting u 2/8/2024, Print           CONTINUE these medications which have NOT CHANGED    Details   meclizine (ANTIVERT) 25 mg tablet Take 1 tablet (25 mg total) by mouth every 8 (eight) hours as needed for dizziness Can substitute generic, Starting Sat 10/7/2023, Normal                 PDMP Review       None            ED Provider  Electronically Signed by             Allen Ortiz PA-C  02/08/24 3693

## 2024-07-01 ENCOUNTER — APPOINTMENT (EMERGENCY)
Dept: RADIOLOGY | Facility: HOSPITAL | Age: 44
End: 2024-07-01

## 2024-07-01 ENCOUNTER — HOSPITAL ENCOUNTER (EMERGENCY)
Facility: HOSPITAL | Age: 44
Discharge: HOME | End: 2024-07-01
Attending: EMERGENCY MEDICINE | Admitting: EMERGENCY MEDICINE

## 2024-07-01 VITALS
SYSTOLIC BLOOD PRESSURE: 133 MMHG | DIASTOLIC BLOOD PRESSURE: 88 MMHG | OXYGEN SATURATION: 98 % | TEMPERATURE: 98.3 F | RESPIRATION RATE: 18 BRPM | HEART RATE: 90 BPM

## 2024-07-01 DIAGNOSIS — R07.9 CHEST PAIN: Primary | ICD-10-CM

## 2024-07-01 LAB
ANION GAP SERPL CALCULATED.3IONS-SCNC: 10 MMOL/L (ref 4–13)
BASOPHILS # BLD AUTO: 0.02 THOUSANDS/ÂΜL (ref 0–0.1)
BASOPHILS NFR BLD AUTO: 0 % (ref 0–1)
BUN SERPL-MCNC: 19 MG/DL (ref 5–25)
CALCIUM SERPL-MCNC: 9.2 MG/DL (ref 8.4–10.2)
CARDIAC TROPONIN I PNL SERPL HS: <2 NG/L
CHLORIDE SERPL-SCNC: 101 MMOL/L (ref 96–108)
CO2 SERPL-SCNC: 25 MMOL/L (ref 21–32)
CREAT SERPL-MCNC: 1.25 MG/DL (ref 0.6–1.3)
EOSINOPHIL # BLD AUTO: 0.12 THOUSAND/ÂΜL (ref 0–0.61)
EOSINOPHIL NFR BLD AUTO: 1 % (ref 0–6)
ERYTHROCYTE [DISTWIDTH] IN BLOOD BY AUTOMATED COUNT: 13 % (ref 11.6–15.1)
GFR SERPL CREATININE-BSD FRML MDRD: 71 ML/MIN/1.73SQ M
GLUCOSE SERPL-MCNC: 99 MG/DL (ref 65–140)
HCT VFR BLD AUTO: 43.2 % (ref 36.5–49.3)
HGB BLD-MCNC: 14.5 G/DL (ref 12–17)
IMM GRANULOCYTES # BLD AUTO: 0.03 THOUSAND/UL (ref 0–0.2)
IMM GRANULOCYTES NFR BLD AUTO: 0 % (ref 0–2)
LYMPHOCYTES # BLD AUTO: 2.99 THOUSANDS/ÂΜL (ref 0.6–4.47)
LYMPHOCYTES NFR BLD AUTO: 36 % (ref 14–44)
MCH RBC QN AUTO: 29.1 PG (ref 26.8–34.3)
MCHC RBC AUTO-ENTMCNC: 33.6 G/DL (ref 31.4–37.4)
MCV RBC AUTO: 87 FL (ref 82–98)
MONOCYTES # BLD AUTO: 0.54 THOUSAND/ÂΜL (ref 0.17–1.22)
MONOCYTES NFR BLD AUTO: 7 % (ref 4–12)
NEUTROPHILS # BLD AUTO: 4.61 THOUSANDS/ÂΜL (ref 1.85–7.62)
NEUTS SEG NFR BLD AUTO: 56 % (ref 43–75)
NRBC BLD AUTO-RTO: 0 /100 WBCS
PLATELET # BLD AUTO: 275 THOUSANDS/UL (ref 149–390)
PMV BLD AUTO: 10.7 FL (ref 8.9–12.7)
POTASSIUM SERPL-SCNC: 3.6 MMOL/L (ref 3.5–5.3)
RBC # BLD AUTO: 4.99 MILLION/UL (ref 3.88–5.62)
SODIUM SERPL-SCNC: 136 MMOL/L (ref 135–147)
WBC # BLD AUTO: 8.31 THOUSAND/UL (ref 4.31–10.16)

## 2024-07-01 PROCEDURE — 99284 EMERGENCY DEPT VISIT MOD MDM: CPT | Performed by: EMERGENCY MEDICINE

## 2024-07-01 PROCEDURE — 99285 EMERGENCY DEPT VISIT HI MDM: CPT

## 2024-07-01 PROCEDURE — 36415 COLL VENOUS BLD VENIPUNCTURE: CPT | Performed by: EMERGENCY MEDICINE

## 2024-07-01 PROCEDURE — 84484 ASSAY OF TROPONIN QUANT: CPT | Performed by: EMERGENCY MEDICINE

## 2024-07-01 PROCEDURE — 71045 X-RAY EXAM CHEST 1 VIEW: CPT

## 2024-07-01 PROCEDURE — 85025 COMPLETE CBC W/AUTO DIFF WBC: CPT | Performed by: EMERGENCY MEDICINE

## 2024-07-01 PROCEDURE — 93005 ELECTROCARDIOGRAM TRACING: CPT

## 2024-07-01 PROCEDURE — 80048 BASIC METABOLIC PNL TOTAL CA: CPT | Performed by: EMERGENCY MEDICINE

## 2024-07-01 RX ORDER — ASPIRIN 325 MG
325 TABLET ORAL ONCE
Status: COMPLETED | OUTPATIENT
Start: 2024-07-01 | End: 2024-07-01

## 2024-07-01 RX ADMIN — ASPIRIN 325 MG ORAL TABLET 325 MG: 325 PILL ORAL at 22:22

## 2024-07-02 LAB
ATRIAL RATE: 92 BPM
P AXIS: 54 DEGREES
PR INTERVAL: 138 MS
QRS AXIS: 55 DEGREES
QRSD INTERVAL: 110 MS
QT INTERVAL: 362 MS
QTC INTERVAL: 447 MS
T WAVE AXIS: 47 DEGREES
VENTRICULAR RATE: 92 BPM

## 2024-07-02 PROCEDURE — 93010 ELECTROCARDIOGRAM REPORT: CPT | Performed by: INTERNAL MEDICINE

## 2024-07-02 NOTE — ED PROVIDER NOTES
"History  Chief Complaint   Patient presents with    Chest Pain     Pt presents with L sided CP radiating down L arm, sudden onset \"after receiving bad news\". Denies N/V/SOB.      43-year-old male history of high cholesterol presents complaining of chest pain to his left chest radiating down his left arm with abnormal sensation in his left arm that he describes as a being asleep.  Symptoms began shortly prior to arrival.  He states that they began after he received bad news about a family member.  He reports mild shortness of breath.  No leg swelling.  He did have a prior episode of chest pain like this before and and is unclear what happened.        Prior to Admission Medications   Prescriptions Last Dose Informant Patient Reported? Taking?   acetaminophen (TYLENOL) 500 mg tablet   No No   Sig: Take 1 tablet (500 mg total) by mouth every 6 (six) hours as needed for mild pain, moderate pain, headaches or fever   dexamethasone (DECADRON) 2 mg tablet   No No   Sig: Take 3 tablets (6 mg total) by mouth daily with breakfast   ibuprofen (MOTRIN) 400 mg tablet   No No   Sig: Take 1 tablet (400 mg total) by mouth every 6 (six) hours as needed for mild pain   meclizine (ANTIVERT) 25 mg tablet   No No   Sig: Take 1 tablet (25 mg total) by mouth every 8 (eight) hours as needed for dizziness Can substitute generic   methocarbamol (ROBAXIN) 500 mg tablet   No No   Sig: Take 1 tablet (500 mg total) by mouth 2 (two) times a day      Facility-Administered Medications: None       History reviewed. No pertinent past medical history.    History reviewed. No pertinent surgical history.    History reviewed. No pertinent family history.  I have reviewed and agree with the history as documented.    E-Cigarette/Vaping    E-Cigarette Use Never User      E-Cigarette/Vaping Substances     Social History     Tobacco Use    Smoking status: Former     Types: Cigarettes    Smokeless tobacco: Never   Vaping Use    Vaping status: Never Used "   Substance Use Topics    Alcohol use: Yes    Drug use: Not Currently       Review of Systems   All other systems reviewed and are negative.      Physical Exam  Physical Exam  Constitutional:       General: He is not in acute distress.  HENT:      Mouth/Throat:      Mouth: Mucous membranes are moist.   Eyes:      Conjunctiva/sclera: Conjunctivae normal.   Cardiovascular:      Rate and Rhythm: Normal rate and regular rhythm.      Heart sounds: Normal heart sounds.   Pulmonary:      Effort: Pulmonary effort is normal.      Breath sounds: Normal breath sounds.   Musculoskeletal:         General: Normal range of motion.      Cervical back: Neck supple.      Right lower leg: No edema.      Left lower leg: No edema.   Skin:     General: Skin is warm and dry.   Neurological:      General: No focal deficit present.      Mental Status: He is alert and oriented to person, place, and time.      Comments: Equal subjective sensation to bilateral arms, normal strength bilaterally   Psychiatric:         Mood and Affect: Mood normal.         Behavior: Behavior normal.         Vital Signs  ED Triage Vitals [07/01/24 2121]   Temperature Pulse Respirations Blood Pressure SpO2   98.3 °F (36.8 °C) 90 18 133/88 98 %      Temp Source Heart Rate Source Patient Position - Orthostatic VS BP Location FiO2 (%)   Oral Monitor Lying -- --      Pain Score       --           Vitals:    07/01/24 2121   BP: 133/88   Pulse: 90   Patient Position - Orthostatic VS: Lying         Visual Acuity      ED Medications  Medications   aspirin tablet 325 mg (325 mg Oral Given 7/1/24 2222)       Diagnostic Studies  Results Reviewed       Procedure Component Value Units Date/Time    HS Troponin I 4hr [[id number]]     Lab Status: No result Specimen: Blood     HS Troponin 0hr (reflex protocol) [[id number]]  (Normal) Collected: 07/01/24 2129    Lab Status: Final result Specimen: Blood from Arm, Right Updated: 07/01/24 2155     hs TnI 0hr <2 ng/L     Basic metabolic  panel [[id number]] Collected: 07/01/24 2129    Lab Status: Final result Specimen: Blood from Arm, Right Updated: 07/01/24 2150     Sodium 136 mmol/L      Potassium 3.6 mmol/L      Chloride 101 mmol/L      CO2 25 mmol/L      ANION GAP 10 mmol/L      BUN 19 mg/dL      Creatinine 1.25 mg/dL      Glucose 99 mg/dL      Calcium 9.2 mg/dL      eGFR 71 ml/min/1.73sq m     Narrative:      [employer] guidelines for Chronic Kidney Disease (CKD):     Stage 1 with normal or high GFR (GFR > 90 mL/min/1.73 square meters)    Stage 2 Mild CKD (GFR = 60-89 mL/min/1.73 square meters)    Stage 3A Moderate CKD (GFR = 45-59 mL/min/1.73 square meters)    Stage 3B Moderate CKD (GFR = 30-44 mL/min/1.73 square meters)    Stage 4 Severe CKD (GFR = 15-29 mL/min/1.73 square meters)    Stage 5 End Stage CKD (GFR <15 mL/min/1.73 square meters)  Note: GFR calculation is accurate only with a steady state creatinine    CBC and differential [[id number]] Collected: 07/01/24 2129    Lab Status: Final result Specimen: Blood from Arm, Right Updated: 07/01/24 2133     WBC 8.31 Thousand/uL      RBC 4.99 Million/uL      Hemoglobin 14.5 g/dL      Hematocrit 43.2 %      MCV 87 fL      MCH 29.1 pg      MCHC 33.6 g/dL      RDW 13.0 %      MPV 10.7 fL      Platelets 275 Thousands/uL      nRBC 0 /100 WBCs      Segmented % 56 %      Immature Grans % 0 %      Lymphocytes % 36 %      Monocytes % 7 %      Eosinophils Relative 1 %      Basophils Relative 0 %      Absolute Neutrophils 4.61 Thousands/µL      Absolute Immature Grans 0.03 Thousand/uL      Absolute Lymphocytes 2.99 Thousands/µL      Absolute Monocytes 0.54 Thousand/µL      Eosinophils Absolute 0.12 Thousand/µL      Basophils Absolute 0.02 Thousands/µL                    XR chest 1 view portable    (Results Pending)              Procedures  Procedures         ED Course         43-year-old male presenting with acute onset chest pain with symptoms radiating to left arm.  Patient  initially endorses numbness to left arm but has normal subjective sensation bilaterally and also normal strength in bilateral arms.  No abnormal facial sensation or lower extremity symptoms.  On arrival he has normal vitals.  He is mildly anxious appearing.  Initial EKG shows normal sinus rhythm 92 bpm with incomplete right bundle branch block no acute ST or T wave abnormalities overall unchanged from prior per my independent interpretation.  Chest x-ray shows no acute disease per my independent interpretation.  Screening labs notable for normal CBC, normal BMP, and initial troponin less than 2.  I planned to obtain a 2-hour troponin on this patient based on the time of onset of his pain however patient requested to leave after initial lab results to go care for his child who was also distressed about the news that he received today.  I counseled patient that we cannot adequately rule out ACS without obtaining serial troponins and that I am concerned about his history possibly representing a heart attack.  Patient states understanding of this risk as well as understanding that he is free to return in the future at any time to complete his workup.  He does report that he feels greatly improved at this time.                                    Medical Decision Making  Amount and/or Complexity of Data Reviewed  Labs: ordered.  Radiology: ordered.    Risk  OTC drugs.             Disposition  Final diagnoses:   Chest pain     Time reflects when diagnosis was documented in both MDM as applicable and the Disposition within this note       Time User Action Codes Description Comment    7/1/2024 10:16 PM Elza Narayan Add [R07.9] Chest pain           ED Disposition       ED Disposition   Discharge    Condition   Stable    Date/Time   Mon Jul 1, 2024 10:16 PM    Comment   Ryder Atkinson discharge to home/self care.                   Follow-up Information       Follow up With Specialties Details Why Contact Info  Additional Information    [facility] Schedule an appointment as soon as possible for a visit   [address] [facility] [id number] [phone] [address] [address] [phone]            Discharge Medication List as of 7/1/2024 10:18 PM        CONTINUE these medications which have NOT CHANGED    Details   acetaminophen (TYLENOL) 500 mg tablet Take 1 tablet (500 mg total) by mouth every 6 (six) hours as needed for mild pain, moderate pain, headaches or fever, Starting Thu 2/8/2024, Print      dexamethasone (DECADRON) 2 mg tablet Take 3 tablets (6 mg total) by mouth daily with breakfast, Starting Thu 2/8/2024, Normal      ibuprofen (MOTRIN) 400 mg tablet Take 1 tablet (400 mg total) by mouth every 6 (six) hours as needed for mild pain, Starting Thu 2/8/2024, Print      meclizine (ANTIVERT) 25 mg tablet Take 1 tablet (25 mg total) by mouth every 8 (eight) hours as needed for dizziness Can substitute generic, Starting Sat 10/7/2023, Normal      methocarbamol (ROBAXIN) 500 mg tablet Take 1 tablet (500 mg total) by mouth 2 (two) times a day, Starting Thu 2/8/2024, Normal             No discharge procedures on file.    PDMP Review       None            ED Provider  Electronically Signed by             Elza Narayan MD  07/02/24 0025

## 2024-07-02 NOTE — ED NOTES
Discharge instructions reviewed with pt. Pt verbalized understanding. And has no further questions at this time. Pt ambulatory off unit with steady gait.      Luz Thompson RN  07/01/24 [id number]